# Patient Record
Sex: FEMALE | Race: WHITE | NOT HISPANIC OR LATINO | Employment: OTHER | URBAN - METROPOLITAN AREA
[De-identification: names, ages, dates, MRNs, and addresses within clinical notes are randomized per-mention and may not be internally consistent; named-entity substitution may affect disease eponyms.]

---

## 2023-03-07 ENCOUNTER — TELEPHONE (OUTPATIENT)
Dept: PRIMARY CARE | Facility: CLINIC | Age: 78
End: 2023-03-07
Payer: MEDICARE

## 2023-03-07 RX ORDER — LEVOTHYROXINE SODIUM 112 UG/1
112 TABLET ORAL
COMMUNITY
Start: 2013-07-29 | End: 2024-01-10 | Stop reason: SDUPTHER

## 2023-03-07 RX ORDER — PRAVASTATIN SODIUM 80 MG/1
1 TABLET ORAL NIGHTLY
COMMUNITY
Start: 2013-05-27 | End: 2023-11-08

## 2023-03-08 ENCOUNTER — DOCUMENTATION (OUTPATIENT)
Dept: PRIMARY CARE | Facility: CLINIC | Age: 78
End: 2023-03-08
Payer: MEDICARE

## 2023-05-01 PROBLEM — F41.9 ANXIETY: Status: ACTIVE | Noted: 2023-05-01

## 2023-05-01 PROBLEM — N81.10 VAGINAL PROLAPSE: Status: ACTIVE | Noted: 2023-05-01

## 2023-05-01 PROBLEM — N89.8 VAGINAL DRYNESS: Status: ACTIVE | Noted: 2023-05-01

## 2023-05-01 PROBLEM — E78.5 HYPERLIPIDEMIA: Status: ACTIVE | Noted: 2023-05-01

## 2023-05-01 PROBLEM — I10 HYPERTENSION: Status: ACTIVE | Noted: 2023-05-01

## 2023-05-01 PROBLEM — M85.80 OSTEOPENIA: Status: ACTIVE | Noted: 2023-05-01

## 2023-05-01 PROBLEM — M19.042 ARTHRITIS OF BOTH HANDS: Status: ACTIVE | Noted: 2023-05-01

## 2023-05-01 PROBLEM — L71.9 ROSACEA: Status: ACTIVE | Noted: 2023-05-01

## 2023-05-01 PROBLEM — E55.9 VITAMIN D DEFICIENCY: Status: ACTIVE | Noted: 2023-05-01

## 2023-05-01 PROBLEM — M19.011 ARTHRITIS OF SHOULDER REGION, RIGHT: Status: ACTIVE | Noted: 2023-05-01

## 2023-05-01 PROBLEM — E03.9 HYPOTHYROIDISM: Status: ACTIVE | Noted: 2023-05-01

## 2023-05-01 PROBLEM — M19.041 ARTHRITIS OF BOTH HANDS: Status: ACTIVE | Noted: 2023-05-01

## 2023-05-01 PROBLEM — K21.9 GERD (GASTROESOPHAGEAL REFLUX DISEASE): Status: ACTIVE | Noted: 2023-05-01

## 2023-05-01 RX ORDER — ESTRADIOL 0.1 MG/G
CREAM VAGINAL
COMMUNITY
Start: 2022-06-17 | End: 2024-06-10 | Stop reason: SDUPTHER

## 2023-05-01 RX ORDER — METOPROLOL SUCCINATE 50 MG/1
TABLET, EXTENDED RELEASE ORAL 2 TIMES DAILY
COMMUNITY
Start: 2017-02-15 | End: 2023-05-03 | Stop reason: SDUPTHER

## 2023-05-01 RX ORDER — METOPROLOL TARTRATE 25 MG/1
TABLET, FILM COATED ORAL
COMMUNITY
Start: 2023-01-19 | End: 2023-05-03 | Stop reason: SDUPTHER

## 2023-05-01 RX ORDER — OMEPRAZOLE 20 MG/1
1 TABLET, DELAYED RELEASE ORAL DAILY
COMMUNITY
Start: 2022-10-13 | End: 2023-05-03

## 2023-05-01 RX ORDER — LORAZEPAM 0.5 MG/1
TABLET ORAL
COMMUNITY
Start: 2022-12-21 | End: 2023-08-04 | Stop reason: SDUPTHER

## 2023-05-01 NOTE — PROGRESS NOTES
Subjective   Yasmine Ortiz is a 77 y.o. female who presents for patient is here for a follow-up.  HPI  Yasmine is a 77-year-old female with known history of hypertension hypothyroidism, dyslipidemia patient takes medication prescribed.  Unremarkable denies chest pain shortness of breath fever chills, mammogram planned for July 2023 , brought in Blood pressure chart BP is well controled.     Review of Systems  10 system review pertinent as above  Objective     There were no vitals taken for this visit.   Physical Exam    HEENT: Atraumatic normocephalic the pupils are equal and round and reactive to light the sclerae nonicteric extraocular motion are intact.  Neck: Is supple without JVD no carotid bruits the trachea is midline there are no masses pulses are equal and bilateral with normal upstroke.  Skin: Normal.  Skin good texture.  Moist.  Good turgor.  No lesions, no rashes.  Lymph: No lymphadenopathy appreciated, no masses, no lesions  Lungs: Are clear to auscultation and percussion, good breath sounds bilaterally, no rhonchi, no wheezing, good diaphragmatic excursion.  Heart: Normal rate and normal rhythm S1, S2, no S3, no gallop, murmur or rub.  Abdomen: Soft, nontender, no organomegaly, good bowel sounds.    Extremities: Full range of motion, good pulses bilateral.  No cyanosis, no clubbing or edema.  Neuro: Cranial nerves II-XII are grossly intact there is no sensory or motor deficits.  Able to move all extremities.        Assessment/Plan     Here for follow-up, fasting blood work    CBC BMP lipids AST ALT vitamin D 25-hydroxy  TSH    Colonoscopy 2017 doing okay H.at this time unless symptoms  Mammogram July 2022  Bone density 2021    Heart Palpitatuion  Metoprolol tartarated take as needed for heart palpitation    Hypertension  No added salt diet, do not and salt to your food  Try to exercise every other day for 30 minutes  Continue current medications  Metoprolol succinate XL 25 mg in the morning  And 25 mg at  night (half of 50 mg)      Hypothyroidism  Continue current medications  Report any changes such as weight gain or weight loss  Continue to exercise regularly    Continue with the low-fat, low-cholesterol diet,  I recommended Mediterranean diet, which include fish, chicken, vegetables and olive oil  Exercise daily for 30 minutes at least 3 times a week  Continue home medications      Gastroesophageal reflux disease  Continue current medications  Include spicy foods do not eat late at night  Do not wear tight fitting clothes  Exercise daily    Back pain   Reviewed with patient core exercises          Problem List Items Addressed This Visit          Circulatory    Hypertension - Primary    Relevant Medications    metoprolol succinate XL (Toprol-XL) 50 mg 24 hr tablet       Digestive    GERD (gastroesophageal reflux disease)       Genitourinary    Vaginal dryness       Musculoskeletal    Osteopenia       Endocrine/Metabolic    Hypothyroidism       Other    Hyperlipidemia     Other Visit Diagnoses       Heart palpitations        Relevant Medications    metoprolol tartrate (Lopressor) 25 mg tablet              Brenden Milner MD

## 2023-05-03 ENCOUNTER — OFFICE VISIT (OUTPATIENT)
Dept: PRIMARY CARE | Facility: CLINIC | Age: 78
End: 2023-05-03
Payer: MEDICARE

## 2023-05-03 VITALS — BODY MASS INDEX: 22.99 KG/M2 | WEIGHT: 138 LBS | HEIGHT: 65 IN

## 2023-05-03 DIAGNOSIS — N89.8 VAGINAL DRYNESS: ICD-10-CM

## 2023-05-03 DIAGNOSIS — E03.9 HYPOTHYROIDISM, UNSPECIFIED TYPE: ICD-10-CM

## 2023-05-03 DIAGNOSIS — M85.80 OSTEOPENIA, UNSPECIFIED LOCATION: ICD-10-CM

## 2023-05-03 DIAGNOSIS — E78.5 HYPERLIPIDEMIA, UNSPECIFIED HYPERLIPIDEMIA TYPE: ICD-10-CM

## 2023-05-03 DIAGNOSIS — R00.2 HEART PALPITATIONS: ICD-10-CM

## 2023-05-03 DIAGNOSIS — I10 PRIMARY HYPERTENSION: Primary | ICD-10-CM

## 2023-05-03 DIAGNOSIS — K21.9 GASTROESOPHAGEAL REFLUX DISEASE WITHOUT ESOPHAGITIS: ICD-10-CM

## 2023-05-03 PROBLEM — I47.10 SVT (SUPRAVENTRICULAR TACHYCARDIA) (CMS-HCC): Status: ACTIVE | Noted: 2019-06-04

## 2023-05-03 PROCEDURE — G0438 PPPS, INITIAL VISIT: HCPCS | Performed by: INTERNAL MEDICINE

## 2023-05-03 PROCEDURE — 99214 OFFICE O/P EST MOD 30 MIN: CPT | Performed by: INTERNAL MEDICINE

## 2023-05-03 PROCEDURE — 1170F FXNL STATUS ASSESSED: CPT | Performed by: INTERNAL MEDICINE

## 2023-05-03 PROCEDURE — 1036F TOBACCO NON-USER: CPT | Performed by: INTERNAL MEDICINE

## 2023-05-03 PROCEDURE — 1157F ADVNC CARE PLAN IN RCRD: CPT | Performed by: INTERNAL MEDICINE

## 2023-05-03 RX ORDER — METOPROLOL SUCCINATE 50 MG/1
25 TABLET, EXTENDED RELEASE ORAL 2 TIMES DAILY
Qty: 60 TABLET | Refills: 3
Start: 2023-05-03 | End: 2023-11-08

## 2023-05-03 RX ORDER — METOPROLOL TARTRATE 25 MG/1
25 TABLET, FILM COATED ORAL DAILY
Qty: 30 TABLET | Refills: 3
Start: 2023-05-03

## 2023-05-03 RX ORDER — AMOXICILLIN 500 MG/1
CAPSULE ORAL
COMMUNITY
Start: 2023-03-22 | End: 2023-08-04 | Stop reason: ALTCHOICE

## 2023-05-03 ASSESSMENT — ACTIVITIES OF DAILY LIVING (ADL)
ADEQUATE_TO_COMPLETE_ADL: YES
HEARING - RIGHT EAR: FUNCTIONAL
FEEDING YOURSELF: INDEPENDENT
BATHING: INDEPENDENT
USING TRANSPORTATION: INDEPENDENT
PATIENT'S MEMORY ADEQUATE TO SAFELY COMPLETE DAILY ACTIVITIES?: YES
NEEDS ASSISTANCE WITH FOOD: INDEPENDENT
STIL DRIVING: YES
WALKS IN HOME: INDEPENDENT
TOILETING: INDEPENDENT
DOING HOUSEWORK: INDEPENDENT
USING TELEPHONE: INDEPENDENT
HEARING - LEFT EAR: FUNCTIONAL
EATING: INDEPENDENT
DRESSING YOURSELF: INDEPENDENT
GROOMING: INDEPENDENT
MANAGING FINANCES: INDEPENDENT
JUDGMENT_ADEQUATE_SAFELY_COMPLETE_DAILY_ACTIVITIES: YES
TAKING MEDICATION: INDEPENDENT
PILL BOX USED: NO
GROCERY SHOPPING: INDEPENDENT
PREPARING MEALS: INDEPENDENT

## 2023-05-03 ASSESSMENT — ANXIETY QUESTIONNAIRES
5. BEING SO RESTLESS THAT IT IS HARD TO SIT STILL: NOT AT ALL
2. NOT BEING ABLE TO STOP OR CONTROL WORRYING: NOT AT ALL
1. FEELING NERVOUS, ANXIOUS, OR ON EDGE: SEVERAL DAYS
GAD7 TOTAL SCORE: 3
3. WORRYING TOO MUCH ABOUT DIFFERENT THINGS: NOT AT ALL
6. BECOMING EASILY ANNOYED OR IRRITABLE: SEVERAL DAYS
7. FEELING AFRAID AS IF SOMETHING AWFUL MIGHT HAPPEN: NOT AT ALL
4. TROUBLE RELAXING: SEVERAL DAYS

## 2023-05-03 ASSESSMENT — PATIENT HEALTH QUESTIONNAIRE - PHQ9
SUM OF ALL RESPONSES TO PHQ9 QUESTIONS 1 AND 2: 0
2. FEELING DOWN, DEPRESSED OR HOPELESS: NOT AT ALL
1. LITTLE INTEREST OR PLEASURE IN DOING THINGS: NOT AT ALL

## 2023-05-03 ASSESSMENT — COLUMBIA-SUICIDE SEVERITY RATING SCALE - C-SSRS
6. HAVE YOU EVER DONE ANYTHING, STARTED TO DO ANYTHING, OR PREPARED TO DO ANYTHING TO END YOUR LIFE?: NO
1. IN THE PAST MONTH, HAVE YOU WISHED YOU WERE DEAD OR WISHED YOU COULD GO TO SLEEP AND NOT WAKE UP?: NO
2. HAVE YOU ACTUALLY HAD ANY THOUGHTS OF KILLING YOURSELF?: NO

## 2023-05-03 ASSESSMENT — ENCOUNTER SYMPTOMS
DEPRESSION: 0
OCCASIONAL FEELINGS OF UNSTEADINESS: 0
LOSS OF SENSATION IN FEET: 0

## 2023-05-03 NOTE — PROGRESS NOTES
"Subjective   Reason for Visit: Yasmine Ortiz is an 77 y.o. female here for a Medicare Wellness visit.               HPI  Yasmine is a 69-year-old here for Medicare wellness, she takes her medication prescribed she lives independently at home, she is self-sufficient reports no falling no syncope no near syncope.  Patient Care Team:  Brenden Milner MD as PCP - General     Review of Systems  10 system review pertinent as above  Objective   Vitals:  Ht 1.651 m (5' 5\")   Wt 62.6 kg (138 lb)   BMI 22.96 kg/m²       Physical Exam  HEENT: Atraumatic normocephalic the pupils are equal and round and reactive to light the sclerae nonicteric extraocular motion are intact.  Neck: Is supple without JVD no carotid bruits the trachea is midline there are no masses pulses are equal and bilateral with normal upstroke.  Skin: Normal.  Skin good texture.  Moist.  Good turgor.  No lesions, no rashes.  Lymph: No lymphadenopathy appreciated, no masses, no lesions  Lungs: Are clear to auscultation and percussion, good breath sounds bilaterally, no rhonchi, no wheezing, good diaphragmatic excursion.  Heart: Normal rate and normal rhythm S1, S2, no S3, no gallop, murmur or rub.  Abdomen: Soft, nontender, no organomegaly, good bowel sounds.    Extremities: Full range of motion, good pulses bilateral.  No cyanosis, no clubbing or edema.  Neuro: Cranial nerves II-XII are grossly intact there is no sensory or motor deficits.  Able to move all extremities.  Assessment/Plan     Medicare wellness  Problem List Items Addressed This Visit          Circulatory    Hypertension - Primary    Relevant Medications    metoprolol succinate XL (Toprol-XL) 50 mg 24 hr tablet       Digestive    GERD (gastroesophageal reflux disease)       Genitourinary    Vaginal dryness       Musculoskeletal    Osteopenia       Endocrine/Metabolic    Hypothyroidism       Other    Hyperlipidemia     Other Visit Diagnoses       Heart palpitations        Relevant Medications    " metoprolol tartrate (Lopressor) 25 mg tablet

## 2023-06-23 ENCOUNTER — TELEPHONE (OUTPATIENT)
Dept: PRIMARY CARE | Facility: CLINIC | Age: 78
End: 2023-06-23
Payer: MEDICARE

## 2023-07-11 ENCOUNTER — TELEPHONE (OUTPATIENT)
Dept: PRIMARY CARE | Facility: CLINIC | Age: 78
End: 2023-07-11
Payer: MEDICARE

## 2023-07-19 LAB — URINE CULTURE: ABNORMAL

## 2023-08-02 NOTE — PROGRESS NOTES
Subjective   Yasmine Ortiz is a 77 y.o. female who presents for patient is here for a follow-up.  HPI  Yasmine is a 77-year-old female with known history of hypertension hypothyroidism, dyslipidemia patient takes medication prescribed.  Patient denies chest pain shortness of breath fever chill nausea vomiting constipation diarrhea dysuria urgency frequency.  Patient is here for follow-up and fasting blood work.    Review of Systems  10 system review pertinent as above  Objective     Visit Vitals  /76   Pulse 78   Temp 36.8 °C (98.2 °F)   Resp 15      Physical Exam    HEENT: Atraumatic normocephalic the pupils are equal and round and reactive to light the sclerae nonicteric extraocular motion are intact.  Neck: Is supple without JVD no carotid bruits the trachea is midline there are no masses pulses are equal and bilateral with normal upstroke.  Skin: Normal.  Skin good texture.  Moist.  Good turgor.  No lesions, no rashes.  Lymph: No lymphadenopathy appreciated, no masses, no lesions  Lungs: Are clear to auscultation and percussion, good breath sounds bilaterally, no rhonchi, no wheezing, good diaphragmatic excursion.  Heart: Normal rate and normal rhythm S1, S2, no S3, no gallop, murmur or rub.  Abdomen: Soft, nontender, no organomegaly, good bowel sounds.    Extremities: Full range of motion, good pulses bilateral.  No cyanosis, no clubbing or edema.  Neuro: Cranial nerves II-XII are grossly intact there is no sensory or motor deficits.  Able to move all extremities.      Assessment/Plan     Here for blood work    CBC BMP lipids AST ALT vitamin D 25-hydroxy    Last colonoscopy 2017 asymptomatic  Mammogram July 2022  Bone density 2021    Heart Palpitatuion  Metoprolol tartarated 25 mg daily  Doing well    Hypertension  No added salt diet, do not and salt to your food  Try to exercise every other day for 30 minutes  Continue current medications  Metoprolol 50 mg daily  25 mg in addition for palpitation  PRN    Hypothyroidism  Continue current medications  Report any changes such as weight gain or weight loss  Continue to exercise regularly  Goes to [facility] in [place]    Continue with the low-fat, low-cholesterol diet,  I recommended Mediterranean diet, which include fish, chicken, vegetables and olive oil  Exercise daily for 30 minutes at least 3 times a week  Continue home medications    Gastroesophageal reflux disease  Continue current medications  Include spicy foods do not eat late at night  Do not wear tight fitting clothes  Exercise daily    Anxiety with panic  On as needed lorazepam  Last prescription February 24, 2023  For 30 pills    6 mm Pul module non smoker  CT Chest repeat March 2024 repeat     OARRS:  Brenden Milner MD on 8/4/2023 10:13 AM  I have personally reviewed the OARRS report for Yasmine Ortiz. I have considered the risks of abuse, dependence, addiction and diversion and I believe that it is clinically appropriate for Yasmine Ortiz to be prescribed this medication    Is the patient prescribed a combination of a benzodiazepine and opioid?  No    Last Urine Drug Screen / ordered today: Yes  Recent Results (from the past 85540 hour(s))   Drug Screen, Urine With Reflex to Confirmation    Collection Time: 02/15/23 10:54 AM   Result Value Ref Range    DRUG SCREEN COMMENT URINE SEE BELOW     Amphetamine Screen, Urine PRESUMPTIVE NEGATIVE NEGATIVE    Barbiturate Screen, Urine PRESUMPTIVE NEGATIVE NEGATIVE    BENZODIAZEPINE (PRESENCE) IN URINE BY SCREEN METHOD PRESUMPTIVE NEGATIVE NEGATIVE    Cannabinoid Screen, Urine PRESUMPTIVE NEGATIVE NEGATIVE    Cocaine Screen, Urine PRESUMPTIVE NEGATIVE NEGATIVE    Fentanyl, Ur PRESUMPTIVE NEGATIVE NEGATIVE    Methadone Screen, Urine PRESUMPTIVE NEGATIVE NEGATIVE    Opiate Screen, Urine PRESUMPTIVE NEGATIVE NEGATIVE    Oxycodone Screen, Ur PRESUMPTIVE NEGATIVE NEGATIVE    PCP Screen, Urine PRESUMPTIVE NEGATIVE NEGATIVE     Results are as expected.      Controlled Substance Agreement:  Date of the Last Agreement: August 4, 2023  Reviewed Controlled Substance Agreement including but not limited to the benefits, risks, and alternatives to treatment with a Controlled Substance medication(s).    Benzodiazepines:  What is the patient's goal of therapy?  Anxiety panic  Is this being achieved with current treatment?  Yes    SANDRA-7:  No data recorded    Activities of Daily Living:   Is your overall impression that this patient is benefiting (symptom reduction outweighs side effects) from benzodiazepine therapy? Yes     1. Physical Functioning: Better  2. Family Relationship: Better  3. Social Relationship: Better  4. Mood: Better  5. Sleep Patterns: Better  6. Overall Function: Better  Problem List Items Addressed This Visit       Anxiety    Relevant Medications    LORazepam (Ativan) 0.5 mg tablet    GERD (gastroesophageal reflux disease)    Relevant Orders    CBC    Hyperlipidemia    Relevant Orders    Lipid Panel    AST    ALT    Hypothyroidism    Relevant Orders    TSH    Hypertension    Relevant Orders    Basic Metabolic Panel    Vitamin D deficiency    Relevant Orders    Vitamin D 25-Hydroxy,Total    SVT (supraventricular tachycardia) (CMS/HCC)    Medication management - Primary    Relevant Orders    Drug Screen, Urine With Reflex to Confirmation       Brenden Milner MD

## 2023-08-04 ENCOUNTER — OFFICE VISIT (OUTPATIENT)
Dept: PRIMARY CARE | Facility: CLINIC | Age: 78
End: 2023-08-04
Payer: MEDICARE

## 2023-08-04 VITALS
SYSTOLIC BLOOD PRESSURE: 122 MMHG | TEMPERATURE: 98.2 F | HEIGHT: 65 IN | HEART RATE: 78 BPM | RESPIRATION RATE: 15 BRPM | DIASTOLIC BLOOD PRESSURE: 76 MMHG | BODY MASS INDEX: 22.99 KG/M2 | WEIGHT: 138 LBS

## 2023-08-04 DIAGNOSIS — Z79.899 MEDICATION MANAGEMENT: Primary | ICD-10-CM

## 2023-08-04 DIAGNOSIS — E03.9 HYPOTHYROIDISM, UNSPECIFIED TYPE: ICD-10-CM

## 2023-08-04 DIAGNOSIS — E78.5 HYPERLIPIDEMIA, UNSPECIFIED HYPERLIPIDEMIA TYPE: ICD-10-CM

## 2023-08-04 DIAGNOSIS — K21.9 GASTROESOPHAGEAL REFLUX DISEASE WITHOUT ESOPHAGITIS: ICD-10-CM

## 2023-08-04 DIAGNOSIS — I10 PRIMARY HYPERTENSION: ICD-10-CM

## 2023-08-04 DIAGNOSIS — I47.10 SVT (SUPRAVENTRICULAR TACHYCARDIA) (CMS-HCC): ICD-10-CM

## 2023-08-04 DIAGNOSIS — F41.9 ANXIETY: ICD-10-CM

## 2023-08-04 DIAGNOSIS — E55.9 VITAMIN D DEFICIENCY: ICD-10-CM

## 2023-08-04 PROBLEM — L84 PRE-ULCERATIVE CORN OR CALLOUS: Status: ACTIVE | Noted: 2023-08-04

## 2023-08-04 PROCEDURE — 99214 OFFICE O/P EST MOD 30 MIN: CPT | Performed by: INTERNAL MEDICINE

## 2023-08-04 PROCEDURE — 80307 DRUG TEST PRSMV CHEM ANLYZR: CPT

## 2023-08-04 PROCEDURE — 80048 BASIC METABOLIC PNL TOTAL CA: CPT | Performed by: INTERNAL MEDICINE

## 2023-08-04 PROCEDURE — 1126F AMNT PAIN NOTED NONE PRSNT: CPT | Performed by: INTERNAL MEDICINE

## 2023-08-04 PROCEDURE — 82306 VITAMIN D 25 HYDROXY: CPT | Performed by: INTERNAL MEDICINE

## 2023-08-04 PROCEDURE — 84450 TRANSFERASE (AST) (SGOT): CPT | Performed by: INTERNAL MEDICINE

## 2023-08-04 PROCEDURE — 84443 ASSAY THYROID STIM HORMONE: CPT | Performed by: INTERNAL MEDICINE

## 2023-08-04 PROCEDURE — 1036F TOBACCO NON-USER: CPT | Performed by: INTERNAL MEDICINE

## 2023-08-04 PROCEDURE — 1157F ADVNC CARE PLAN IN RCRD: CPT | Performed by: INTERNAL MEDICINE

## 2023-08-04 PROCEDURE — 3078F DIAST BP <80 MM HG: CPT | Performed by: INTERNAL MEDICINE

## 2023-08-04 PROCEDURE — 84460 ALANINE AMINO (ALT) (SGPT): CPT | Performed by: INTERNAL MEDICINE

## 2023-08-04 PROCEDURE — 1159F MED LIST DOCD IN RCRD: CPT | Performed by: INTERNAL MEDICINE

## 2023-08-04 PROCEDURE — 85025 COMPLETE CBC W/AUTO DIFF WBC: CPT | Performed by: INTERNAL MEDICINE

## 2023-08-04 PROCEDURE — 3074F SYST BP LT 130 MM HG: CPT | Performed by: INTERNAL MEDICINE

## 2023-08-04 PROCEDURE — 80061 LIPID PANEL: CPT | Performed by: INTERNAL MEDICINE

## 2023-08-04 RX ORDER — DICLOFENAC SODIUM 10 MG/G
GEL TOPICAL
COMMUNITY
Start: 2023-05-05

## 2023-08-04 RX ORDER — LORAZEPAM 0.5 MG/1
0.5 TABLET ORAL DAILY
Qty: 30 TABLET | Refills: 0 | Status: SHIPPED | OUTPATIENT
Start: 2023-08-04 | End: 2023-11-29 | Stop reason: SDUPTHER

## 2023-08-04 RX ORDER — VIT C/E/ZN/COPPR/LUTEIN/ZEAXAN 250MG-90MG
1000 CAPSULE ORAL
COMMUNITY

## 2023-08-04 RX ORDER — TAMSULOSIN HYDROCHLORIDE 0.4 MG/1
0.4 CAPSULE ORAL
COMMUNITY
Start: 2022-03-02 | End: 2023-08-04 | Stop reason: ALTCHOICE

## 2023-08-04 ASSESSMENT — ENCOUNTER SYMPTOMS
DEPRESSION: 0
OCCASIONAL FEELINGS OF UNSTEADINESS: 0
LOSS OF SENSATION IN FEET: 0

## 2023-08-04 ASSESSMENT — PAIN SCALES - GENERAL: PAINLEVEL: 0-NO PAIN

## 2023-08-05 LAB
AMPHETAMINE (PRESENCE) IN URINE BY SCREEN METHOD: NORMAL
BARBITURATES PRESENCE IN URINE BY SCREEN METHOD: NORMAL
BENZODIAZEPINE (PRESENCE) IN URINE BY SCREEN METHOD: NORMAL
CANNABINOIDS IN URINE BY SCREEN METHOD: NORMAL
COCAINE (PRESENCE) IN URINE BY SCREEN METHOD: NORMAL
DRUG SCREEN COMMENT URINE: NORMAL
FENTANYL URINE: NORMAL
METHADONE (PRESENCE) IN URINE BY SCREEN METHOD: NORMAL
OPIATES (PRESENCE) IN URINE BY SCREEN METHOD: NORMAL
OXYCODONE (PRESENCE) IN URINE BY SCREEN METHOD: NORMAL
PHENCYCLIDINE (PRESENCE) IN URINE BY SCREEN METHOD: NORMAL

## 2023-10-04 ENCOUNTER — TELEPHONE (OUTPATIENT)
Dept: PRIMARY CARE | Facility: CLINIC | Age: 78
End: 2023-10-04
Payer: MEDICARE

## 2023-10-05 ENCOUNTER — TELEMEDICINE (OUTPATIENT)
Dept: PRIMARY CARE | Facility: CLINIC | Age: 78
End: 2023-10-05
Payer: MEDICARE

## 2023-10-05 DIAGNOSIS — J01.01 ACUTE RECURRENT MAXILLARY SINUSITIS: Primary | ICD-10-CM

## 2023-10-05 PROCEDURE — 99213 OFFICE O/P EST LOW 20 MIN: CPT | Performed by: PHYSICIAN ASSISTANT

## 2023-10-05 RX ORDER — AMOXICILLIN AND CLAVULANATE POTASSIUM 500; 125 MG/1; MG/1
500 TABLET, FILM COATED ORAL 2 TIMES DAILY
Qty: 20 TABLET | Refills: 0 | Status: SHIPPED | OUTPATIENT
Start: 2023-10-05 | End: 2023-10-15

## 2023-10-05 ASSESSMENT — ENCOUNTER SYMPTOMS
ABDOMINAL PAIN: 0
DIFFICULTY URINATING: 0
PALPITATIONS: 0
FREQUENCY: 0
WEAKNESS: 0
TREMORS: 0
VOMITING: 0
FACIAL SWELLING: 0
SLEEP DISTURBANCE: 0
POLYDIPSIA: 0
COUGH: 1
CHILLS: 0
HEADACHES: 0
EYE PAIN: 0
NAUSEA: 0
APPETITE CHANGE: 0
CHEST TIGHTNESS: 0
EYE DISCHARGE: 0
DIARRHEA: 0
SORE THROAT: 1
HEMATURIA: 0
SINUS PAIN: 1
CONSTIPATION: 0
WHEEZING: 0
FATIGUE: 0
NERVOUS/ANXIOUS: 0
POLYPHAGIA: 0
ARTHRALGIAS: 0
SINUS PRESSURE: 1
DIZZINESS: 0
COLOR CHANGE: 0
ANAL BLEEDING: 0
JOINT SWELLING: 0
SHORTNESS OF BREATH: 0
CONFUSION: 0
MYALGIAS: 0
CHOKING: 0
NUMBNESS: 0
FEVER: 0
ABDOMINAL DISTENTION: 0

## 2023-10-05 NOTE — PROGRESS NOTES
Subjective   Patient ID: Yasmine Ortiz is a 78 y.o. female with known history of hypertension hypothyroidism, dyslipidemia who presents for Sinusitis.    HPI the patient is complaining of sinus infection with nasal discharge, sore throat, and  cough which she has been having for almost two weeks. She denies fever, chills, wheezing or shortness of breath.  She tested negative for COVID 19.    Review of Systems   Constitutional:  Negative for appetite change, chills, fatigue and fever.   HENT:  Positive for congestion, sinus pressure, sinus pain and sore throat. Negative for ear pain, facial swelling, hearing loss and nosebleeds.    Eyes:  Negative for pain, discharge and visual disturbance.   Respiratory:  Positive for cough. Negative for choking, chest tightness, shortness of breath and wheezing.    Cardiovascular:  Negative for chest pain, palpitations and leg swelling.   Gastrointestinal:  Negative for abdominal distention, abdominal pain, anal bleeding, constipation, diarrhea, nausea and vomiting.   Endocrine: Negative for cold intolerance, heat intolerance, polydipsia, polyphagia and polyuria.   Genitourinary:  Negative for difficulty urinating, frequency, hematuria and urgency.   Musculoskeletal:  Negative for arthralgias, gait problem, joint swelling and myalgias.   Skin:  Negative for color change and rash.   Neurological:  Negative for dizziness, tremors, syncope, weakness, numbness and headaches.   Psychiatric/Behavioral:  Negative for behavioral problems, confusion, sleep disturbance and suicidal ideas. The patient is not nervous/anxious.        Objective   There were no vitals taken for this visit.    Physical Exam    Assessment/Plan     Acute sinusitis  Start Augmentin 500-125 mg twice daily and a full stomach  Continue Mucinex D    Last colonoscopy 2017 asymptomatic  Mammogram July 2022  Bone density 2021    Heart Palpitatuion  Metoprolol tartarated 25 mg daily  Doing well    Hypertension  No added salt  diet, do not and salt to your food  Try to exercise every other day for 30 minutes  Continue current medications  Metoprolol 50 mg daily  25 mg in addition for palpitation PRN    Hypothyroidism  Continue current medications  Report any changes such as weight gain or weight loss  Continue to exercise regularly  Goes to [facility] in [place]    Continue with the low-fat, low-cholesterol diet,  I recommended Mediterranean diet, which include fish, chicken, vegetables and olive oil  Exercise daily for 30 minutes at least 3 times a week  Continue home medications    Gastroesophageal reflux disease  Continue current medications  Include spicy foods do not eat late at night  Do not wear tight fitting clothes  Exercise daily    Anxiety with panic  On as needed lorazepam  Last prescription February 24, 2023  For 30 pills    6 mm Pul module non smoker  CT Chest repeat March 2024 repeat

## 2023-10-20 PROBLEM — M25.511 RIGHT SHOULDER PAIN: Status: ACTIVE | Noted: 2023-10-20

## 2023-10-20 NOTE — PROGRESS NOTES
Physical Therapy  Physical Therapy Treatment Note    Patient Name: Yasmine Ortiz  MRN: [id number]  Today's Date: 10/24/2023  Time Calculation  Start Time: 1045  Stop Time: 1130  Time Calculation (min): 45 min  Referring provider: Dr. Noel Mercado    Insurance:  Visit number: 7 of [place]  Authorization info: no auth needed  Insurance Type: Medicare  onset 23   Medicare Certification Period: Beginnin2023 Ending: 10/ 10/ 2023    Medicare Recertification Period:  Beginning: 10/24/23 Endin23    Current Problem  1. Right shoulder pain, unspecified chronicity  Follow Up In Physical Therapy        General     Precautions   R shoulder replacement in 2016.     Subjective:   Subjective   Patient reports She was trying to put a picture up at home over her head and it made her bicep and pec really sore and painful. She wants to update her HEP. She thinks that some of the exercises are easier. She is still challenged by the ER exercises.     Pain   2-3/10    Objective:   Objective   23:  palpation: mild TTP along R upper trap     UE ROM: WNL B, no pain      UE MMT: R L  shoulder flex 5 / 5   scapt 4+ / 5   abd 4+ / 5   ER 4 / 5   IR 4 / 5  Bicep 5 / 5  Tricep 5 / 5        23  posture: RS, FH, TK      palpation: no TTP, pain located in anterior deltoid, bicep and supraspinatus and infraspinatus      scapular control: WNL     UE ROM: WNL B, no pain     UE MMT: R L     shoulder flex 4 / 5   scapt 4 / 5   abd 4 / 5   ER 4- / 5   IR 4 / 5  Bicep 4 / 5  Tricep 4+ / 5  .   Outcome Measures   Quick Dash score: 15.91      Treatments:   TE: (30')  UBE x 6 min  Standing ER w/ RTB  Standing flexion 2# 2 x10, first round with therapist scap assist  Standing scaption 2# 2 x10   Supine serratus punch 2# 2 x 10    Manual: STM to bicep and pec (10')    OP Education:   Access Code: [email or web address] (updated HEP today).      Assessment:  Pt tolerated progressing strengthening exercises well without increase in bicep  soreness. She is demonstrating increased strength and compliance with HEP over the last 4 weeks, She feels that she can do HEP for another 4 weeks on her own and see where she is then. Overall she is progressing ROM and strength well.      Plan:   Planned interventions include: cryotherapy, education/instruction, home program, hot pack, manual therapy, neuromuscular re-education and therapeutic exercises.   Frequency and duration: 1 time(s) a week, for 8-10 weeks.   Potential to achieve rehab goals is good       c.w. progress RTC strengthening; follow up in 4 weeks and assess how HEP is going. RECHECK nv.      Plan of care was developed with input and agreement by the patient.      Goals:     Active       PT Problem       PT Goal 1       Start:  10/24/23    Expected End:  12/24/23       Pt will be independent with ADLs including watering her plants without pain.  Pt will be independent with HEP  Pt will improve strength of R shoulder to 5/5 or to = L  Pt will report +4 on GROC                    Dania Ku, PT

## 2023-10-22 PROBLEM — R00.2 PALPITATION: Status: ACTIVE | Noted: 2023-10-22

## 2023-10-22 PROBLEM — M67.449 MUCOUS CYST OF FINGER: Status: ACTIVE | Noted: 2023-10-22

## 2023-10-22 RX ORDER — GUAIFENESIN, PSEUDOEPHEDRINE HYDROCHLORIDE 600; 60 MG/1; MG/1
1 TABLET, EXTENDED RELEASE ORAL DAILY PRN
COMMUNITY

## 2023-10-22 RX ORDER — BISMUTH SUBSALICYLATE 262 MG
1 TABLET,CHEWABLE ORAL DAILY
COMMUNITY

## 2023-10-22 RX ORDER — METRONIDAZOLE 7.5 MG/G
CREAM TOPICAL
COMMUNITY
End: 2024-01-10 | Stop reason: ALTCHOICE

## 2023-10-24 ENCOUNTER — TREATMENT (OUTPATIENT)
Dept: PHYSICAL THERAPY | Facility: CLINIC | Age: 78
End: 2023-10-24
Payer: MEDICARE

## 2023-10-24 DIAGNOSIS — M25.511 RIGHT SHOULDER PAIN, UNSPECIFIED CHRONICITY: Primary | ICD-10-CM

## 2023-10-24 PROCEDURE — 97140 MANUAL THERAPY 1/> REGIONS: CPT | Mod: GP

## 2023-10-24 PROCEDURE — 97110 THERAPEUTIC EXERCISES: CPT | Mod: GP

## 2023-10-25 ENCOUNTER — OFFICE VISIT (OUTPATIENT)
Dept: ORTHOPEDIC SURGERY | Facility: HOSPITAL | Age: 78
End: 2023-10-25
Payer: MEDICARE

## 2023-10-25 DIAGNOSIS — M19.019 SHOULDER ARTHRITIS: Primary | ICD-10-CM

## 2023-10-25 PROCEDURE — 99212 OFFICE O/P EST SF 10 MIN: CPT | Performed by: ORTHOPAEDIC SURGERY

## 2023-10-25 PROCEDURE — 1159F MED LIST DOCD IN RCRD: CPT | Performed by: ORTHOPAEDIC SURGERY

## 2023-10-25 PROCEDURE — 1126F AMNT PAIN NOTED NONE PRSNT: CPT | Performed by: ORTHOPAEDIC SURGERY

## 2023-10-25 PROCEDURE — 1036F TOBACCO NON-USER: CPT | Performed by: ORTHOPAEDIC SURGERY

## 2023-10-25 NOTE — PROGRESS NOTES
Patient is status post shoulder arthroplasty.  She has been having some pain and weakness and had 1 episode of possible partial subluxation.  She is doing generally well she has improved with therapy.  She has very little pain and is aware of slight weakness.    Right shoulder elevation 160 motor power abduction 4+ external rotation 4+ internal rotation 5.    Right shoulder arthritis    Patient is doing well.  She has improved significantly with therapy.  She should complete her therapy course and follow-up for x-ray again in March.    This was dictated using voice recognition software and not corrected for grammatical or spelling errors.   100

## 2023-11-08 DIAGNOSIS — E78.5 HYPERLIPIDEMIA, UNSPECIFIED HYPERLIPIDEMIA TYPE: Primary | ICD-10-CM

## 2023-11-08 DIAGNOSIS — I10 PRIMARY HYPERTENSION: ICD-10-CM

## 2023-11-08 RX ORDER — METOPROLOL SUCCINATE 50 MG/1
50 TABLET, EXTENDED RELEASE ORAL DAILY
Qty: 90 TABLET | Refills: 3 | Status: SHIPPED | OUTPATIENT
Start: 2023-11-08

## 2023-11-08 RX ORDER — PRAVASTATIN SODIUM 80 MG/1
80 TABLET ORAL NIGHTLY
Qty: 90 TABLET | Refills: 3 | Status: SHIPPED | OUTPATIENT
Start: 2023-11-08

## 2023-11-22 NOTE — PROGRESS NOTES
Physical Therapy  Physical Therapy Treatment Note    Patient Name: Yasmine Ortiz  MRN: [id number]  Today's Date: 2023  Time Calculation  Start Time: 0945  Stop Time: 1030  Time Calculation (min): 45 min    Referring provider: Dr. Noel Mercado  Onset date: 23    Insurance:  Visit number: 8 of [place]  Authorization info: no auth needed  Insurance Type: Medicare  onset 23   Medicare Certification Period: Beginnin2023 Ending: 10/ 10/ 2023    Medicare Recertification Period:  Beginning: 10/24/23 Endin23    Current Problem  1. Right shoulder pain, unspecified chronicity  Follow Up In Physical Therapy        General     Precautions   R shoulder replacement in 2016.     Subjective:   Subjective   Patient reports she has been doing well since coming in 4 weeks ago. She gets some soreness and pain if she has to carry something wide like the teresa totes.     Pain   0/10, more of a tightness and soreness    Objective:   Objective   23 RECHECK  palpation: mild TTP along R upper trap and pec minor  UE ROM: WNL B     UE MMT: R L  shoulder flex 5 / 5   scapt 4+/ 5   abd 4+ / 5   ER 4/ 5   IR 4+ / 5  Bicep 5 / 5  Tricep 5 / 5      23:  palpation: mild TTP along R upper trap     UE ROM: WNL B, no pain      UE MMT: R L  shoulder flex 5 / 5   scapt 4+ / 5   abd 4+ / 5   ER 4 / 5   IR 4 / 5  Bicep 5 / 5  Tricep 5 / 5        23  posture: RS, FH, TK      palpation: no TTP, pain located in anterior deltoid, bicep and supraspinatus and infraspinatus      scapular control: WNL     UE ROM: WNL B, no pain     UE MMT: R L     shoulder flex 4 / 5   scapt 4 / 5   abd 4 / 5   ER 4- / 5   IR 4 / 5  Bicep 4 / 5  Tricep 4+ / 5  .   Outcome Measures   Quick Dash score: 15.91, 23 15.91     Treatments:   TE: (45')  UBE x 6 min    RECHECK    Review Upper trap and Levator scap stretches 2 x 30s each   Should Horr Add Iso 2 x 10 w/ 10s hold  Review Standing flexion 2# x10   Review Standing scaption  2# x10   Review Supine serratus punch 2# x 10    Manual: DTM to Upper trap (10')    OP Education:   Access Code: [email or web address]     Assessment:     Pt is progressing fairly, She has slightly improve strength but improved ADLs without pain. She cont to have strength deficits with Shoulder ER. Her pec minor pain improved after isometric exercises but is experiencing some pain in her triceps intermittently.     Plan:   Planned interventions include: cryotherapy, education/instruction, home program, hot pack, manual therapy, neuromuscular re-education and therapeutic exercises.   Frequency and duration: 1 time(s) a week, for 8-10 weeks.   Potential to achieve rehab goals is good       c.w. progress RTC strengthening; follow up in 4 weeks and assess how HEP is going.     Plan of care was developed with input and agreement by the patient.      Goals:     Active       PT Problem       PT Goal 1       Start:  10/24/23    Expected End:  12/24/23       Pt will be independent with ADLs including watering her plants without pain.  Pt will be independent with HEP  Pt will improve strength of R shoulder to 5/5 or to = L  Pt will report +4 on GROC                    Dania Ku, PT

## 2023-11-27 ENCOUNTER — TREATMENT (OUTPATIENT)
Dept: PHYSICAL THERAPY | Facility: CLINIC | Age: 78
End: 2023-11-27
Payer: MEDICARE

## 2023-11-27 DIAGNOSIS — M25.511 RIGHT SHOULDER PAIN, UNSPECIFIED CHRONICITY: Primary | ICD-10-CM

## 2023-11-27 PROCEDURE — 97110 THERAPEUTIC EXERCISES: CPT | Mod: GP

## 2023-11-28 ENCOUNTER — OFFICE VISIT (OUTPATIENT)
Dept: PRIMARY CARE | Facility: CLINIC | Age: 78
End: 2023-11-28
Payer: MEDICARE

## 2023-11-28 VITALS
DIASTOLIC BLOOD PRESSURE: 86 MMHG | WEIGHT: 142 LBS | TEMPERATURE: 95.7 F | HEIGHT: 65 IN | BODY MASS INDEX: 23.66 KG/M2 | SYSTOLIC BLOOD PRESSURE: 128 MMHG | HEART RATE: 86 BPM

## 2023-11-28 DIAGNOSIS — I10 PRIMARY HYPERTENSION: Primary | ICD-10-CM

## 2023-11-28 DIAGNOSIS — E78.5 HYPERLIPIDEMIA, UNSPECIFIED HYPERLIPIDEMIA TYPE: ICD-10-CM

## 2023-11-28 PROCEDURE — 3074F SYST BP LT 130 MM HG: CPT | Performed by: PHYSICIAN ASSISTANT

## 2023-11-28 PROCEDURE — 1036F TOBACCO NON-USER: CPT | Performed by: PHYSICIAN ASSISTANT

## 2023-11-28 PROCEDURE — 3079F DIAST BP 80-89 MM HG: CPT | Performed by: PHYSICIAN ASSISTANT

## 2023-11-28 PROCEDURE — 93000 ELECTROCARDIOGRAM COMPLETE: CPT | Performed by: PHYSICIAN ASSISTANT

## 2023-11-28 PROCEDURE — 1159F MED LIST DOCD IN RCRD: CPT | Performed by: PHYSICIAN ASSISTANT

## 2023-11-28 PROCEDURE — 99214 OFFICE O/P EST MOD 30 MIN: CPT | Performed by: PHYSICIAN ASSISTANT

## 2023-11-28 PROCEDURE — 1160F RVW MEDS BY RX/DR IN RCRD: CPT | Performed by: PHYSICIAN ASSISTANT

## 2023-11-28 PROCEDURE — 1126F AMNT PAIN NOTED NONE PRSNT: CPT | Performed by: PHYSICIAN ASSISTANT

## 2023-11-28 ASSESSMENT — ENCOUNTER SYMPTOMS
CONSTIPATION: 0
FREQUENCY: 0
SORE THROAT: 0
ANAL BLEEDING: 0
DIZZINESS: 0
APPETITE CHANGE: 0
HEADACHES: 0
ABDOMINAL DISTENTION: 0
SLEEP DISTURBANCE: 0
FACIAL SWELLING: 0
HEMATURIA: 0
NAUSEA: 0
SHORTNESS OF BREATH: 0
COUGH: 0
CHILLS: 0
CHOKING: 0
LOSS OF SENSATION IN FEET: 0
ABDOMINAL PAIN: 0
NUMBNESS: 0
DIFFICULTY URINATING: 0
EYE PAIN: 0
COLOR CHANGE: 0
NERVOUS/ANXIOUS: 0
EYE DISCHARGE: 0
FEVER: 0
JOINT SWELLING: 0
PALPITATIONS: 0
WEAKNESS: 0
WHEEZING: 0
POLYDIPSIA: 0
DIARRHEA: 0
OCCASIONAL FEELINGS OF UNSTEADINESS: 0
HYPERTENSION: 1
VOMITING: 0
POLYPHAGIA: 0
TREMORS: 0
CHEST TIGHTNESS: 0
MYALGIAS: 0
CONFUSION: 0
ARTHRALGIAS: 0
FATIGUE: 0
DEPRESSION: 0

## 2023-11-28 ASSESSMENT — PATIENT HEALTH QUESTIONNAIRE - PHQ9
2. FEELING DOWN, DEPRESSED OR HOPELESS: NOT AT ALL
1. LITTLE INTEREST OR PLEASURE IN DOING THINGS: NOT AT ALL
SUM OF ALL RESPONSES TO PHQ9 QUESTIONS 1 AND 2: 0

## 2023-11-28 NOTE — PROGRESS NOTES
Subjective   Patient ID: Yasmine Ortiz is a 78 y.o. female with known history of hypertension hypothyroidism, dyslipidemia who presents for Hypertension.    Hypertension  Pertinent negatives include no chest pain, headaches, palpitations or shortness of breath.   The patient is complaining of high blood pressure which was 159/96 and 170/90 4 days ago. It went down to below 140/82 mmHg the next three days.  Stated that she did not monitor her blood pressure until she developed high pulse which made her monitor BP for 4 days. She felt pressure in her chest which lasted from a few seconds to a few minutes. She is currently on metoprolol XL 25 mg and occasionally takes metoprolol tartrate 25 mg as needed for heart palpitations. Denies SOB.     Review of Systems   Constitutional:  Negative for appetite change, chills, fatigue and fever.   HENT:  Negative for congestion, ear pain, facial swelling, hearing loss, nosebleeds and sore throat.    Eyes:  Negative for pain, discharge and visual disturbance.   Respiratory:  Negative for cough, choking, chest tightness, shortness of breath and wheezing.    Cardiovascular:  Negative for chest pain, palpitations and leg swelling.   Gastrointestinal:  Negative for abdominal distention, abdominal pain, anal bleeding, constipation, diarrhea, nausea and vomiting.   Endocrine: Negative for cold intolerance, heat intolerance, polydipsia, polyphagia and polyuria.   Genitourinary:  Negative for difficulty urinating, frequency, hematuria and urgency.   Musculoskeletal:  Negative for arthralgias, gait problem, joint swelling and myalgias.   Skin:  Negative for color change and rash.   Neurological:  Negative for dizziness, tremors, syncope, weakness, numbness and headaches.   Psychiatric/Behavioral:  Negative for behavioral problems, confusion, sleep disturbance and suicidal ideas. The patient is not nervous/anxious.        Objective   /86   Pulse 86   Temp 35.4 °C (95.7 °F)   Ht  "1.651 m (5' 5\")   Wt 64.4 kg (142 lb)   Breastfeeding No   BMI 23.63 kg/m²     Physical Exam    Assessment/Plan   Hypertension  Well controlled  Continue Metoprolol XR 50 mg daily and Metoprolol tartrate 25 mg in addition for palpitation   No added salt diet, do not and salt to your food  Try to exercise every day for 30 minutes  EKG is done today  In sinus rhythm   No acute changes  Monitor blood pressure daily   If elevated, come back to the office  Follow up with cardiology in January     Last colonoscopy 2017   Mammogram July 2022  Bone density 2021    Heart Palpitatuion  Metoprolol tartarated 25 mg daily  Doing well    Hypothyroidism  Last TSH normal  Continue current medications  Report any changes such as weight gain or weight loss  Continue to exercise regularly  Goes to [facility] in [place]    Continue with the low-fat, low-cholesterol diet,  I recommended Mediterranean diet, which include fish, chicken, vegetables and olive oil  Exercise daily for 30 minutes at least 3 times a week  Continue home medications    Gastroesophageal reflux disease  Continue current medications  Include spicy foods do not eat late at night  Do not wear tight fitting clothes  Exercise daily    Anxiety with panic  On lorazepam as needed   Drug screen done in August 6 mm Pul module non smoker  CT Chest repeat March 2024 repeat      "

## 2023-11-29 DIAGNOSIS — F41.9 ANXIETY: ICD-10-CM

## 2023-11-29 RX ORDER — LORAZEPAM 0.5 MG/1
0.5 TABLET ORAL DAILY
Qty: 30 TABLET | Refills: 0 | Status: SHIPPED | OUTPATIENT
Start: 2023-11-29 | End: 2023-12-26

## 2023-12-19 NOTE — PROGRESS NOTES
Physical Therapy  Physical Therapy Treatment Note    Patient Name: Yasmine Ortiz  MRN: [id number]  Today's Date: 2023  Time Calculation  Start Time: 1245  Stop Time: 1330  Time Calculation (min): 45 min    Referring provider: Dr. Noel Mercado  Onset date: 23    Insurance:  Visit number: 9 [place]  Authorization info: no auth needed  Insurance Type: Medicare  onset 23   Medicare Certification Period: Beginnin2023 Ending: 10/ 10/ 2023    Medicare Recertification Period:  Beginning: 10/24/23 Endin23    Current Problem  1. Right shoulder pain, unspecified chronicity            General     Precautions   R shoulder replacement in 2016.     Subjective:   Subjective   Patient reports she has been doing well since coming in 4 weeks ago. She gets some soreness and pain if she has to carry something wide like the teresa totes.     Pain   0/10, more of a tightness and soreness    Objective:   Objective   23 RECHECK  palpation: mild TTP along R upper trap and pec minor  UE ROM: WNL B     UE MMT: R L  shoulder flex 5 / 5   scapt 4+/ 5   abd 4+ / 5   ER 4/ 5   IR 4+ / 5  Bicep 5 / 5  Tricep 5 / 5      23:  palpation: mild TTP along R upper trap     UE ROM: WNL B, no pain      UE MMT: R L  shoulder flex 5 / 5   scapt 4+ / 5   abd 4+ / 5   ER 4 / 5   IR 4 / 5  Bicep 5 / 5  Tricep 5 / 5        23  posture: RS, FH, TK      palpation: no TTP, pain located in anterior deltoid, bicep and supraspinatus and infraspinatus      scapular control: WNL     UE ROM: WNL B, no pain     UE MMT: R L     shoulder flex 4 / 5   scapt 4 / 5   abd 4 / 5   ER 4- / 5   IR 4 / 5  Bicep 4 / 5  Tricep 4+ / 5  .   Outcome Measures   Quick Dash score: 15.91, 23 15.91     Treatments:   TE: (30')  UBE x 6 min  Therma probe  Review HEP, upgrade to black tube for rows and LAE's     Estim: 10'  Thermaprobe: 5 bar heat + stim level 8 x10'    OP Education:   Access Code: [email or web address]     Assessment:       Pt has  made great progress in the past month with less pain and improving strength.  She will benefit from a fu in 4wks to assess continued progress.      Plan:     c.w. progress RTC strengthening; follow up in 4 weeks and assess how HEP is going.    Planned interventions include: cryotherapy, education/instruction, home program, hot pack, manual therapy, neuromuscular re-education and therapeutic exercises.   Frequency and duration: 1 time(s) a week, for 8-10 weeks.   Potential to achieve rehab goals is good       Plan of care was developed with input and agreement by the patient.      Goals:     Active       PT Problem       PT Goal 1       Start:  10/24/23    Expected End:  12/24/23       Pt will be independent with ADLs including watering her plants without pain.  Pt will be independent with HEP  Pt will improve strength of R shoulder to 5/5 or to = L  Pt will report +4 on GROC                    Dania Ku, PT

## 2023-12-20 ENCOUNTER — TREATMENT (OUTPATIENT)
Dept: PHYSICAL THERAPY | Facility: CLINIC | Age: 78
End: 2023-12-20
Payer: MEDICARE

## 2023-12-20 DIAGNOSIS — M25.511 RIGHT SHOULDER PAIN, UNSPECIFIED CHRONICITY: Primary | ICD-10-CM

## 2023-12-20 PROCEDURE — 97110 THERAPEUTIC EXERCISES: CPT | Mod: GP

## 2023-12-20 PROCEDURE — 97032 APPL MODALITY 1+ESTIM EA 15: CPT | Mod: GP

## 2023-12-23 DIAGNOSIS — F41.9 ANXIETY: ICD-10-CM

## 2023-12-26 RX ORDER — LORAZEPAM 0.5 MG/1
0.5 TABLET ORAL DAILY
Qty: 90 TABLET | Refills: 1 | Status: SHIPPED | OUTPATIENT
Start: 2023-12-26

## 2023-12-29 ENCOUNTER — HOSPITAL ENCOUNTER (OUTPATIENT)
Dept: RADIOLOGY | Facility: HOSPITAL | Age: 78
Discharge: HOME | End: 2023-12-29
Payer: MEDICARE

## 2023-12-29 DIAGNOSIS — E78.5 HYPERLIPIDEMIA, UNSPECIFIED HYPERLIPIDEMIA TYPE: ICD-10-CM

## 2023-12-29 DIAGNOSIS — I10 PRIMARY HYPERTENSION: ICD-10-CM

## 2023-12-29 PROCEDURE — 75571 CT HRT W/O DYE W/CA TEST: CPT

## 2024-01-08 NOTE — PROGRESS NOTES
Subjective   Yasmine Ortiz is a 78 y.o. female who presents for patient is here for a follow-up.  HPI  Yasmine is a 77-year-old female with known history of hypertension hypothyroidism, dyslipidemia patient takes medication prescribed.  Patient denies chest pain shortness of breath fever chill nausea vomiting constipation diarrhea dysuria urgency frequency.  Patient is here for follow-up and fasting blood work. Requested refills for Mupirocin and Synthroid. Feeling well otherwise.     Review of Systems  10 system review pertinent as above  Objective     Visit Vitals  /84   Pulse 78   Temp 36.8 °C (98.2 °F)   Resp 16      Physical Exam    HEENT: Atraumatic normocephalic the pupils are equal and round and reactive to light the sclerae nonicteric extraocular motion are intact.  Neck: Is supple without JVD no carotid bruits the trachea is midline there are no masses pulses are equal and bilateral with normal upstroke.  Skin: Normal.  Skin good texture.  Moist.  Good turgor.  No lesions, no rashes.  Lymph: No lymphadenopathy appreciated, no masses, no lesions  Lungs: Are clear to auscultation and percussion, good breath sounds bilaterally, no rhonchi, no wheezing, good diaphragmatic excursion.  Heart: Normal rate and normal rhythm S1, S2, no S3, no gallop, murmur or rub.  Abdomen: Soft, nontender, no organomegaly, good bowel sounds.    Extremities: Full range of motion, good pulses bilateral.  No cyanosis, no clubbing or edema.  Neuro: Cranial nerves II-XII are grossly intact there is no sensory or motor deficits.  Able to move all extremities.      Assessment/Plan     Patient is here for blood work    CBC BMP lipids AST ALT vitamin D 25-hydroxy tsh    Last colonoscopy 2017 asymptomatic  Mammogram July 2022  Bone density 2021    Heart Palpitatuion  Metoprolol tartarated 25 mg daily  Doing well    Hypertension  No added salt diet, do not and salt to your food  Try to exercise every other day for 30 minutes  Continue  current medications  Metoprolol 50 mg daily  25 mg in addition for palpitation when needed    Sinusitis  On pseudoephedrine  Since patient wa 29 years of age  Tolerating no side effects  No palpitation     Hypothyroidism  Continue current medications  Report any changes such as weight gain or weight loss  Continue to exercise regularly  Goes to [facility] in [place]    Follows with Dr Domonique Messina  On esterase     Continue with the low-fat, low-cholesterol diet,  I recommended Mediterranean diet, which include fish, chicken, vegetables and olive oil  Exercise daily for 30 minutes at least 3 times a week  Continue home medications    Gastroesophageal reflux disease  Continue current medications  Include spicy foods do not eat late at night  Do not wear tight fitting clothes  Exercise daily    Anxiety with panic  On as needed lorazepam  Last prescription February 24, 2023  For 30 pills    6 mm Pul module non smoker  CT Chest repeat March 2024 repeat     OARRS:  No data recorded  I have personally reviewed the OARRS report for Yasmine Ortiz. I have considered the risks of abuse, dependence, addiction and diversion and I believe that it is clinically appropriate for Yasmine Ortiz to be prescribed this medication    Is the patient prescribed a combination of a benzodiazepine and opioid?  No    Last Urine Drug Screen / ordered today: Yes  Recent Results (from the past 8760 hour(s))   Drug Screen, Urine With Reflex to Confirmation    Collection Time: 08/04/23 10:42 AM   Result Value Ref Range    DRUG SCREEN COMMENT URINE SEE BELOW     Amphetamine Screen, Urine PRESUMPTIVE NEGATIVE NEGATIVE    Barbiturate Screen, Urine PRESUMPTIVE NEGATIVE NEGATIVE    BENZODIAZEPINE (PRESENCE) IN URINE BY SCREEN METHOD PRESUMPTIVE NEGATIVE NEGATIVE    Cannabinoid Screen, Urine PRESUMPTIVE NEGATIVE NEGATIVE    Cocaine Screen, Urine PRESUMPTIVE NEGATIVE NEGATIVE    Fentanyl, Ur PRESUMPTIVE NEGATIVE NEGATIVE    Methadone Screen, Urine  PRESUMPTIVE NEGATIVE NEGATIVE    Opiate Screen, Urine PRESUMPTIVE NEGATIVE NEGATIVE    Oxycodone Screen, Ur PRESUMPTIVE NEGATIVE NEGATIVE    PCP Screen, Urine PRESUMPTIVE NEGATIVE NEGATIVE     Results are as expected.     Controlled Substance Agreement:  Date of the Last Agreement: August 4, 2023  Reviewed Controlled Substance Agreement including but not limited to the benefits, risks, and alternatives to treatment with a Controlled Substance medication(s).    Benzodiazepines:  What is the patient's goal of therapy?  Anxiety panic  Is this being achieved with current treatment?  Yes    SANDRA-7:  No data recorded    Activities of Daily Living:   Is your overall impression that this patient is benefiting (symptom reduction outweighs side effects) from benzodiazepine therapy? Yes     1. Physical Functioning: Better  2. Family Relationship: Better  3. Social Relationship: Better  4. Mood: Better  5. Sleep Patterns: Better  6. Overall Function: Better  Problem List Items Addressed This Visit       Hypothyroidism - Primary    Relevant Medications    levothyroxine (Synthroid, Levoxyl) 112 mcg tablet    Other Relevant Orders    TSH    Hypertension    Relevant Orders    CBC    Basic Metabolic Panel     Other Visit Diagnoses       Impetigo        Relevant Medications    mupirocin (Bactroban) 2 % cream    Dyslipidemia        Relevant Orders    Lipid Panel    AST    ALT          Brenden Milenr MD

## 2024-01-10 ENCOUNTER — OFFICE VISIT (OUTPATIENT)
Dept: PRIMARY CARE | Facility: CLINIC | Age: 79
End: 2024-01-10
Payer: MEDICARE

## 2024-01-10 VITALS
SYSTOLIC BLOOD PRESSURE: 138 MMHG | TEMPERATURE: 98.2 F | WEIGHT: 140 LBS | HEART RATE: 78 BPM | RESPIRATION RATE: 16 BRPM | HEIGHT: 65 IN | DIASTOLIC BLOOD PRESSURE: 84 MMHG | BODY MASS INDEX: 23.32 KG/M2

## 2024-01-10 DIAGNOSIS — I10 PRIMARY HYPERTENSION: ICD-10-CM

## 2024-01-10 DIAGNOSIS — E03.9 HYPOTHYROIDISM, UNSPECIFIED TYPE: Primary | ICD-10-CM

## 2024-01-10 DIAGNOSIS — E55.9 VITAMIN D INSUFFICIENCY: ICD-10-CM

## 2024-01-10 DIAGNOSIS — E78.5 DYSLIPIDEMIA: ICD-10-CM

## 2024-01-10 DIAGNOSIS — L01.00 IMPETIGO: ICD-10-CM

## 2024-01-10 PROCEDURE — 80061 LIPID PANEL: CPT | Performed by: INTERNAL MEDICINE

## 2024-01-10 PROCEDURE — 84450 TRANSFERASE (AST) (SGOT): CPT | Performed by: INTERNAL MEDICINE

## 2024-01-10 PROCEDURE — 80048 BASIC METABOLIC PNL TOTAL CA: CPT | Performed by: INTERNAL MEDICINE

## 2024-01-10 PROCEDURE — 1036F TOBACCO NON-USER: CPT | Performed by: INTERNAL MEDICINE

## 2024-01-10 PROCEDURE — 3079F DIAST BP 80-89 MM HG: CPT | Performed by: INTERNAL MEDICINE

## 2024-01-10 PROCEDURE — 82306 VITAMIN D 25 HYDROXY: CPT | Performed by: INTERNAL MEDICINE

## 2024-01-10 PROCEDURE — 1126F AMNT PAIN NOTED NONE PRSNT: CPT | Performed by: INTERNAL MEDICINE

## 2024-01-10 PROCEDURE — 1159F MED LIST DOCD IN RCRD: CPT | Performed by: INTERNAL MEDICINE

## 2024-01-10 PROCEDURE — 84460 ALANINE AMINO (ALT) (SGPT): CPT | Performed by: INTERNAL MEDICINE

## 2024-01-10 PROCEDURE — 84443 ASSAY THYROID STIM HORMONE: CPT | Performed by: INTERNAL MEDICINE

## 2024-01-10 PROCEDURE — 3075F SYST BP GE 130 - 139MM HG: CPT | Performed by: INTERNAL MEDICINE

## 2024-01-10 PROCEDURE — 85025 COMPLETE CBC W/AUTO DIFF WBC: CPT | Performed by: INTERNAL MEDICINE

## 2024-01-10 PROCEDURE — 99214 OFFICE O/P EST MOD 30 MIN: CPT | Performed by: INTERNAL MEDICINE

## 2024-01-10 RX ORDER — LEVOTHYROXINE SODIUM 112 UG/1
112 TABLET ORAL
Qty: 90 TABLET | Refills: 3 | Status: SHIPPED | OUTPATIENT
Start: 2024-01-10 | End: 2025-01-09

## 2024-01-10 RX ORDER — MUPIROCIN CALCIUM 20 MG/G
CREAM TOPICAL 3 TIMES DAILY
Qty: 30 G | Refills: 0 | Status: SHIPPED | OUTPATIENT
Start: 2024-01-10 | End: 2024-01-18 | Stop reason: ALTCHOICE

## 2024-01-10 ASSESSMENT — ENCOUNTER SYMPTOMS
OCCASIONAL FEELINGS OF UNSTEADINESS: 0
DEPRESSION: 0
LOSS OF SENSATION IN FEET: 0

## 2024-01-10 ASSESSMENT — PAIN SCALES - GENERAL: PAINLEVEL: 0-NO PAIN

## 2024-01-15 DIAGNOSIS — L30.9 DERMATITIS: Primary | ICD-10-CM

## 2024-01-15 RX ORDER — BACITRACIN 500 [USP'U]/G
1 OINTMENT TOPICAL 2 TIMES DAILY
Qty: 14 G | Refills: 3 | Status: SHIPPED | OUTPATIENT
Start: 2024-01-15 | End: 2024-01-18 | Stop reason: ALTCHOICE

## 2024-01-15 RX ORDER — BACITRACIN 500 [USP'U]/G
1 OINTMENT TOPICAL 2 TIMES DAILY
Qty: 14 G | Refills: 3 | Status: SHIPPED | OUTPATIENT
Start: 2024-01-15 | End: 2024-01-15 | Stop reason: SDUPTHER

## 2024-01-18 ENCOUNTER — TELEPHONE (OUTPATIENT)
Dept: PRIMARY CARE | Facility: CLINIC | Age: 79
End: 2024-01-18
Payer: MEDICARE

## 2024-01-18 RX ORDER — MUPIROCIN 20 MG/G
OINTMENT TOPICAL 3 TIMES DAILY
Qty: 30 G | Refills: 3 | Status: SHIPPED | OUTPATIENT
Start: 2024-01-18 | End: 2024-01-28

## 2024-01-18 NOTE — TELEPHONE ENCOUNTER
[facility] called, pt insurance will not cover the mupirocin cream, it will cover the mupirocin ointment. Please change the pt rx to the Mupirocin ointment. Thanks

## 2024-01-29 ENCOUNTER — APPOINTMENT (OUTPATIENT)
Dept: PHYSICAL THERAPY | Facility: CLINIC | Age: 79
End: 2024-01-29
Payer: MEDICARE

## 2024-02-09 ENCOUNTER — TELEMEDICINE (OUTPATIENT)
Dept: PRIMARY CARE | Facility: CLINIC | Age: 79
End: 2024-02-09
Payer: MEDICARE

## 2024-02-09 DIAGNOSIS — J01.10 ACUTE NON-RECURRENT FRONTAL SINUSITIS: Primary | ICD-10-CM

## 2024-02-09 PROCEDURE — 1157F ADVNC CARE PLAN IN RCRD: CPT | Performed by: INTERNAL MEDICINE

## 2024-02-09 PROCEDURE — 1126F AMNT PAIN NOTED NONE PRSNT: CPT | Performed by: INTERNAL MEDICINE

## 2024-02-09 PROCEDURE — 1036F TOBACCO NON-USER: CPT | Performed by: INTERNAL MEDICINE

## 2024-02-09 PROCEDURE — 99213 OFFICE O/P EST LOW 20 MIN: CPT | Performed by: INTERNAL MEDICINE

## 2024-02-09 PROCEDURE — 1159F MED LIST DOCD IN RCRD: CPT | Performed by: INTERNAL MEDICINE

## 2024-02-09 RX ORDER — AMOXICILLIN AND CLAVULANATE POTASSIUM 500; 125 MG/1; MG/1
500 TABLET, FILM COATED ORAL 3 TIMES DAILY
Qty: 30 TABLET | Refills: 0 | Status: SHIPPED | OUTPATIENT
Start: 2024-02-09 | End: 2024-02-19

## 2024-02-09 NOTE — PROGRESS NOTES
Subjective   Yasmine Ortiz is a 78 y.o. female who presents for patient is here for a virtual visit ear pain .  HPI  Yasmine is a 78-year-old female with known history of hypertension hypothyroidism, dyslipidemia patient takes medication prescribed.  Patient denies chest pain shortness of breath fever chill nausea vomiting constipation diarrhea dysuria urgency frequency.  Patient complaining of right ear pain and sinus congestion for last few days, she is having difficulties breathing through the right nare, due to the congestion.  She denies fever chills diaphoresis no loss of smell or taste, endorses right ear pain right eye socket pain.     Review of Systems  10 system review pertinent as above  Objective   Virtual visit  There were no vitals taken for this visit.     Physical Exam  Virtual visit      Assessment/Plan     Virtual visit  Acute maxillary sinusitis  With complications  Including right otalgia  Right eye pain  Suspect due to increased sinus pressure  Augmentin 500 mg 3 times a day  Hold Sudafed for now  Fluids and rest  Steam as able to try and loosen up the right  Sinus pressure  Call if not improved        Hypertension  No added salt diet, do not and salt to your food  Try to exercise every other day for 30 minutes  Continue current medications  Metoprolol 50 mg daily  25 mg in addition for palpitation when needed    Sinusitis  On pseudoephedrine  Since patient wa 29 years of age  Tolerating no side effects  No palpitation     Hypothyroidism  Continue current medications  Report any changes such as weight gain or weight loss  Continue to exercise regularly  Goes to [facility] in [place]    Follows with Dr Domonique Messina  On esterase     Continue with the low-fat, low-cholesterol diet,  I recommended Mediterranean diet, which include fish, chicken, vegetables and olive oil  Exercise daily for 30 minutes at least 3 times a week  Continue home medications    Gastroesophageal reflux disease  Continue  current medications  Include spicy foods do not eat late at night  Do not wear tight fitting clothes  Exercise daily    Anxiety with panic  On as needed lorazepam  Last prescription February 24, 2023  For 30 pills    6 mm Pul module non smoker  CT Chest repeat March 2024 repeat     OARRS:  No data recorded  I have personally reviewed the OARRS report for Yasmine Ortiz. I have considered the risks of abuse, dependence, addiction and diversion and I believe that it is clinically appropriate for Yasmine Ortiz to be prescribed this medication    Is the patient prescribed a combination of a benzodiazepine and opioid?  No    Last Urine Drug Screen / ordered today: Yes  Recent Results (from the past 8760 hour(s))   Drug Screen, Urine With Reflex to Confirmation    Collection Time: 08/04/23 10:42 AM   Result Value Ref Range    DRUG SCREEN COMMENT URINE SEE BELOW     Amphetamine Screen, Urine PRESUMPTIVE NEGATIVE NEGATIVE    Barbiturate Screen, Urine PRESUMPTIVE NEGATIVE NEGATIVE    BENZODIAZEPINE (PRESENCE) IN URINE BY SCREEN METHOD PRESUMPTIVE NEGATIVE NEGATIVE    Cannabinoid Screen, Urine PRESUMPTIVE NEGATIVE NEGATIVE    Cocaine Screen, Urine PRESUMPTIVE NEGATIVE NEGATIVE    Fentanyl, Ur PRESUMPTIVE NEGATIVE NEGATIVE    Methadone Screen, Urine PRESUMPTIVE NEGATIVE NEGATIVE    Opiate Screen, Urine PRESUMPTIVE NEGATIVE NEGATIVE    Oxycodone Screen, Ur PRESUMPTIVE NEGATIVE NEGATIVE    PCP Screen, Urine PRESUMPTIVE NEGATIVE NEGATIVE     Results are as expected.     Controlled Substance Agreement:  Date of the Last Agreement: August 4, 2023  Reviewed Controlled Substance Agreement including but not limited to the benefits, risks, and alternatives to treatment with a Controlled Substance medication(s).    Benzodiazepines:  What is the patient's goal of therapy?  Anxiety panic  Is this being achieved with current treatment?  Yes    SANDRA-7:  No data recorded    Activities of Daily Living:   Is your overall impression that this  patient is benefiting (symptom reduction outweighs side effects) from benzodiazepine therapy? Yes     1. Physical Functioning: Better  2. Family Relationship: Better  3. Social Relationship: Better  4. Mood: Better  5. Sleep Patterns: Better  6. Overall Function: Better  Problem List Items Addressed This Visit    None  Brenden Milner MD

## 2024-02-13 ENCOUNTER — TELEMEDICINE (OUTPATIENT)
Dept: PRIMARY CARE | Facility: CLINIC | Age: 79
End: 2024-02-13
Payer: MEDICARE

## 2024-02-13 ENCOUNTER — TELEPHONE (OUTPATIENT)
Dept: PRIMARY CARE | Facility: CLINIC | Age: 79
End: 2024-02-13

## 2024-02-13 DIAGNOSIS — E78.5 HYPERLIPIDEMIA, UNSPECIFIED HYPERLIPIDEMIA TYPE: ICD-10-CM

## 2024-02-13 DIAGNOSIS — I10 PRIMARY HYPERTENSION: ICD-10-CM

## 2024-02-13 DIAGNOSIS — J01.00 ACUTE NON-RECURRENT MAXILLARY SINUSITIS: Primary | ICD-10-CM

## 2024-02-13 PROCEDURE — 1126F AMNT PAIN NOTED NONE PRSNT: CPT | Performed by: INTERNAL MEDICINE

## 2024-02-13 PROCEDURE — 1036F TOBACCO NON-USER: CPT | Performed by: INTERNAL MEDICINE

## 2024-02-13 PROCEDURE — 99213 OFFICE O/P EST LOW 20 MIN: CPT | Performed by: INTERNAL MEDICINE

## 2024-02-13 PROCEDURE — 1157F ADVNC CARE PLAN IN RCRD: CPT | Performed by: INTERNAL MEDICINE

## 2024-02-13 PROCEDURE — 1159F MED LIST DOCD IN RCRD: CPT | Performed by: INTERNAL MEDICINE

## 2024-02-13 RX ORDER — PREDNISONE 20 MG/1
40 TABLET ORAL DAILY
Qty: 10 TABLET | Refills: 0 | Status: SHIPPED | OUTPATIENT
Start: 2024-02-13 | End: 2024-02-18

## 2024-02-13 NOTE — PROGRESS NOTES
Subjective   Yasmine Ortiz is a 78 y.o. female who presents for patient is here for a virtual visit sinus pressure  .  HPI  Yasmine is a 78-year-old female with known history of hypertension hypothyroidism, dyslipidemia patient takes medication prescribed.  Patient denies chest pain shortness of breath fever chill nausea vomiting constipation diarrhea dysuria urgency frequency.  Patient endorses sinus congestion and sinus pressure, she is currently on Augmentin on day 5 out of 10, she claims that his symptoms may have improved a little bit but she still having problems breathing through the right nare with sinus congestion and a mild headache.  She denies fever chills diaphoresis and no nasal discharge noted.    Review of Systems  10 system review pertinent as above  Objective   Virtual visit  There were no vitals taken for this visit.     Physical Exam  Virtual visit      Assessment/Plan     Virtual visit  Acute maxillary sinusitis  Initiated Augmentin February 9, 2024  On day 5 out of 10  Sinus pressure on the left has improved  Sinus pressure on the right is the same  Patient completed steam treatment  Still complaining of discomfort on the right  There is no fevers or chills or diaphoresis  Very little discharge of any  Mostly due to dryness  Continue Augmentin as prescribed  Will add prednisone 40 mg daily for 5 days  If not better in 5 days  Will refer to ear nose and throat  Continue with  Fluids and rest      Hypertension  No added salt diet, do not and salt to your food  Try to exercise every other day for 30 minutes  Continue current medications  Metoprolol 50 mg daily  25 mg in addition for palpitation when needed    Sinusitis  On pseudoephedrine  Since patient wa 29 years of age  Tolerating no side effects  No palpitation     Hypothyroidism  Continue current medications  Report any changes such as weight gain or weight loss  Continue to exercise regularly  Goes to [facility] in [place]    Follows  with Dr Domonique Messina  On esterase     Continue with the low-fat, low-cholesterol diet,  I recommended Mediterranean diet, which include fish, chicken, vegetables and olive oil  Exercise daily for 30 minutes at least 3 times a week  Continue home medications    Gastroesophageal reflux disease  Continue current medications  Include spicy foods do not eat late at night  Do not wear tight fitting clothes  Exercise daily    Anxiety with panic  On as needed lorazepam  Last prescription February 24, 2023  For 30 pills    6 mm Pul module non smoker  CT Chest repeat March 2024 repeat     OARRS:  No data recorded  I have personally reviewed the OARRS report for Yasmine Ortiz. I have considered the risks of abuse, dependence, addiction and diversion and I believe that it is clinically appropriate for Yasmine Ortiz to be prescribed this medication    Is the patient prescribed a combination of a benzodiazepine and opioid?  No    Last Urine Drug Screen / ordered today: Yes  Recent Results (from the past 8760 hour(s))   Drug Screen, Urine With Reflex to Confirmation    Collection Time: 08/04/23 10:42 AM   Result Value Ref Range    DRUG SCREEN COMMENT URINE SEE BELOW     Amphetamine Screen, Urine PRESUMPTIVE NEGATIVE NEGATIVE    Barbiturate Screen, Urine PRESUMPTIVE NEGATIVE NEGATIVE    BENZODIAZEPINE (PRESENCE) IN URINE BY SCREEN METHOD PRESUMPTIVE NEGATIVE NEGATIVE    Cannabinoid Screen, Urine PRESUMPTIVE NEGATIVE NEGATIVE    Cocaine Screen, Urine PRESUMPTIVE NEGATIVE NEGATIVE    Fentanyl, Ur PRESUMPTIVE NEGATIVE NEGATIVE    Methadone Screen, Urine PRESUMPTIVE NEGATIVE NEGATIVE    Opiate Screen, Urine PRESUMPTIVE NEGATIVE NEGATIVE    Oxycodone Screen, Ur PRESUMPTIVE NEGATIVE NEGATIVE    PCP Screen, Urine PRESUMPTIVE NEGATIVE NEGATIVE     Results are as expected.     Controlled Substance Agreement:  Date of the Last Agreement: August 4, 2023  Reviewed Controlled Substance Agreement including but not limited to the benefits, risks,  and alternatives to treatment with a Controlled Substance medication(s).    Benzodiazepines:  What is the patient's goal of therapy?  Anxiety panic  Is this being achieved with current treatment?  Yes    SANDRA-7:  No data recorded    Activities of Daily Living:   Is your overall impression that this patient is benefiting (symptom reduction outweighs side effects) from benzodiazepine therapy? Yes     1. Physical Functioning: Better  2. Family Relationship: Better  3. Social Relationship: Better  4. Mood: Better  5. Sleep Patterns: Better  6. Overall Function: Better  Problem List Items Addressed This Visit       Hyperlipidemia    Hypertension     Other Visit Diagnoses       Acute non-recurrent maxillary sinusitis    -  Primary    Relevant Medications    predniSONE (Deltasone) 20 mg tablet          Brenden Milner MD

## 2024-02-20 NOTE — PROGRESS NOTES
Physical Therapy  Physical Therapy Treatment Note    Patient Name: Yasmine Ortiz  MRN: [id number]  Today's Date: 2024  Time Calculation  Start Time: 830  Stop Time: 930  Time Calculation (min): 60 min    Referring provider: Dr. Noel Mercado  Onset date: 23    Insurance:  Visit number: 10 [facility]  Authorization info: no auth needed  Insurance Type: Medicare  onset 23   Medicare Certification Period: Beginnin2023 Ending: 10/ 10/ 2023    Medicare Recertification Period:  Beginning: 10/24/23 Endin23    Current Problem  1. Right shoulder pain, unspecified chronicity            General     Precautions   R shoulder replacement in 2016.     Subjective:   Subjective   Patient reports that she had a biopsy of her back that prevented her from doing exercises per  For 2 weeks then she ended up having a sinus infection for the last couple of weeks so haven't done much.    Pain   0/10, more of a tightness and soreness    Objective:   Objective   23 RECHECK  palpation: mild TTP along R upper trap and pec minor  UE ROM: WNL B     UE MMT: R L  shoulder flex 5 / 5   scapt 4+/ 5   abd 4+ / 5   ER 4/ 5   IR 4+ / 5  Bicep 5 / 5  Tricep 5 / 5      23:  palpation: mild TTP along R upper trap     UE ROM: WNL B, no pain      UE MMT: R L  shoulder flex 5 / 5   scapt 4+ / 5   abd 4+ / 5   ER 4 / 5   IR 4 / 5  Bicep 5 / 5  Tricep 5 / 5        23  posture: RS, FH, TK      palpation: no TTP, pain located in anterior deltoid, bicep and supraspinatus and infraspinatus      scapular control: WNL     UE ROM: WNL B, no pain     UE MMT: R L     shoulder flex 4 / 5   scapt 4 / 5   abd 4 / 5   ER 4- / 5   IR 4 / 5  Bicep 4 / 5  Tricep 4+ / 5  .   Outcome Measures   Quick Dash score: 15.91, 23 15.91     Treatments:   TE: (38')  UBE x 6 min  Therma probe  Review all HEP  Lat pull with Black band (pt given this)    Estim: 10'  Thermaprobe: 5 bar heat + stim level 8 x10' to R UT    OP Education:    Access Code: [email or web address] (updated 2/21/24)    Assessment:       Patient has not been able to do her exercises at home and needed a refresher,  She has a good understanding of her HEP.  She is dropping reps to 10 and building back up to 15 when she can.  She will benefit from a fu in 4wks to assess continued progress.      Plan:     c.w. progress RTC strengthening; follow up in 4 weeks and assess how HEP is going.    Planned interventions include: cryotherapy, education/instruction, home program, hot pack, manual therapy, neuromuscular re-education and therapeutic exercises.   Frequency and duration: 1 time(s) a week, for 8-10 weeks.   Potential to achieve rehab goals is good       Plan of care was developed with input and agreement by the patient.      Goals:     Active       PT Problem       PT Goal 1       Start:  10/24/23    Expected End:  12/24/23       Pt will be independent with ADLs including watering her plants without pain.  Pt will be independent with HEP  Pt will improve strength of R shoulder to 5/5 or to = L  Pt will report +4 on GROC                    Radha Hsieh, PTA

## 2024-02-21 ENCOUNTER — TREATMENT (OUTPATIENT)
Dept: PHYSICAL THERAPY | Facility: CLINIC | Age: 79
End: 2024-02-21
Payer: MEDICARE

## 2024-02-21 DIAGNOSIS — M25.511 RIGHT SHOULDER PAIN, UNSPECIFIED CHRONICITY: Primary | ICD-10-CM

## 2024-02-21 PROCEDURE — 97032 APPL MODALITY 1+ESTIM EA 15: CPT | Mod: GP,CQ

## 2024-02-21 PROCEDURE — 97110 THERAPEUTIC EXERCISES: CPT | Mod: GP,CQ

## 2024-02-26 ENCOUNTER — OFFICE VISIT (OUTPATIENT)
Dept: PRIMARY CARE | Facility: CLINIC | Age: 79
End: 2024-02-26
Payer: MEDICARE

## 2024-02-26 VITALS
WEIGHT: 137 LBS | HEIGHT: 65 IN | TEMPERATURE: 97.9 F | DIASTOLIC BLOOD PRESSURE: 86 MMHG | BODY MASS INDEX: 22.82 KG/M2 | SYSTOLIC BLOOD PRESSURE: 140 MMHG | HEART RATE: 88 BPM | RESPIRATION RATE: 16 BRPM

## 2024-02-26 DIAGNOSIS — E78.5 HYPERLIPIDEMIA, UNSPECIFIED HYPERLIPIDEMIA TYPE: ICD-10-CM

## 2024-02-26 DIAGNOSIS — N30.00 ACUTE CYSTITIS WITHOUT HEMATURIA: Primary | ICD-10-CM

## 2024-02-26 DIAGNOSIS — I10 PRIMARY HYPERTENSION: ICD-10-CM

## 2024-02-26 DIAGNOSIS — E55.9 VITAMIN D DEFICIENCY: ICD-10-CM

## 2024-02-26 LAB
APPEARANCE UR: ABNORMAL
BILIRUB UR QL STRIP: ABNORMAL
COLOR UR: ABNORMAL
GLUCOSE UR STRIP-MCNC: ABNORMAL MG/DL
HGB UR QL STRIP: ABNORMAL
KETONES UR STRIP-MCNC: ABNORMAL MG/DL
LEUKOCYTE ESTERASE UR QL STRIP: ABNORMAL
NITRITE UR QL STRIP: POSITIVE
PH UR STRIP: 5.5 [PH]
PROT UR STRIP-MCNC: ABNORMAL MG/DL
SP GR UR STRIP.AUTO: 1.02
UROBILINOGEN UR STRIP-ACNC: 1 E.U./DL

## 2024-02-26 PROCEDURE — 1159F MED LIST DOCD IN RCRD: CPT | Performed by: INTERNAL MEDICINE

## 2024-02-26 PROCEDURE — 87086 URINE CULTURE/COLONY COUNT: CPT | Performed by: INTERNAL MEDICINE

## 2024-02-26 PROCEDURE — 81003 URINALYSIS AUTO W/O SCOPE: CPT | Performed by: INTERNAL MEDICINE

## 2024-02-26 PROCEDURE — 3079F DIAST BP 80-89 MM HG: CPT | Performed by: INTERNAL MEDICINE

## 2024-02-26 PROCEDURE — 1126F AMNT PAIN NOTED NONE PRSNT: CPT | Performed by: INTERNAL MEDICINE

## 2024-02-26 PROCEDURE — 99214 OFFICE O/P EST MOD 30 MIN: CPT | Performed by: INTERNAL MEDICINE

## 2024-02-26 PROCEDURE — 1157F ADVNC CARE PLAN IN RCRD: CPT | Performed by: INTERNAL MEDICINE

## 2024-02-26 PROCEDURE — 3077F SYST BP >= 140 MM HG: CPT | Performed by: INTERNAL MEDICINE

## 2024-02-26 PROCEDURE — 1036F TOBACCO NON-USER: CPT | Performed by: INTERNAL MEDICINE

## 2024-02-26 RX ORDER — CIPROFLOXACIN 500 MG/1
500 TABLET ORAL 2 TIMES DAILY
Qty: 20 TABLET | Refills: 0 | Status: SHIPPED | OUTPATIENT
Start: 2024-02-26 | End: 2024-03-07

## 2024-02-26 ASSESSMENT — PAIN SCALES - GENERAL: PAINLEVEL: 0-NO PAIN

## 2024-02-26 ASSESSMENT — ENCOUNTER SYMPTOMS
DEPRESSION: 0
LOSS OF SENSATION IN FEET: 0
OCCASIONAL FEELINGS OF UNSTEADINESS: 0

## 2024-02-26 NOTE — PROGRESS NOTES
Subjective   Yasmine Ortiz is a 78 y.o. female who presents for patient is here for a UTI suspect.  HPI  Yasmine is a 77-year-old female with known history of hypertension hypothyroidism, dyslipidemia patient takes medication prescribed.  Patient denies chest pain shortness of breath fever chill nausea vomiting constipation diarrhea dysuria urgency frequency.  Patient is here for follow-up and fasting blood work. Requested refills for Mupirocin and Synthroid. Pressure urination pressure in abdomen bloody urine, historically that's her presentation.      Review of Systems  10 system review pertinent as above  Objective     Visit Vitals  /86   Pulse 88   Temp 36.6 °C (97.9 °F)   Resp 16      Physical Exam    HEENT: Atraumatic normocephalic the pupils are equal and round and reactive to light the sclerae nonicteric extraocular motion are intact.  Neck: Is supple without JVD no carotid bruits the trachea is midline there are no masses pulses are equal and bilateral with normal upstroke.  Skin: Normal.  Skin good texture.  Moist.  Good turgor.  No lesions, no rashes.  Lymph: No lymphadenopathy appreciated, no masses, no lesions  Lungs: Are clear to auscultation and percussion, good breath sounds bilaterally, no rhonchi, no wheezing, good diaphragmatic excursion.  Heart: Normal rate and normal rhythm S1, S2, no S3, no gallop, murmur or rub.  Abdomen: Soft, nontender, no organomegaly, good bowel sounds.    Extremities: Full range of motion, good pulses bilateral.  No cyanosis, no clubbing or edema.  Neuro: Cranial nerves II-XII are grossly intact there is no sensory or motor deficits.  Able to move all extremities.      Assessment/Plan     Suspect UTI  With Hematuria  Symptomatic pelvis  No fever no chills  With Dysuria    Last colonoscopy 2017 asymptomatic  Mammogram July 2022  Bone density 2021    Heart Palpitatuion  Metoprolol tartarated 25 mg daily  Doing well  No new complaints    Hypertension  No added salt diet,  do not and salt to your food  Try to exercise every other day for 30 minutes  Continue current medications  Metoprolol 50 mg daily  25 mg in addition for palpitation when needed    Sinusitis  On pseudoephedrine  Since patient wa 29 years of age  Tolerating no side effects  No palpitation     Hypothyroidism  Continue current medications  Report any changes such as weight gain or weight loss  Continue to exercise regularly  Goes to [facility] in [place]    Follows with Dr Domonique Messina  On esterase     Continue with the low-fat, low-cholesterol diet,  I recommended Mediterranean diet, which include fish, chicken, vegetables and olive oil  Exercise daily for 30 minutes at least 3 times a week  Continue home medications    Gastroesophageal reflux disease  Continue current medications  Include spicy foods do not eat late at night  Do not wear tight fitting clothes  Exercise daily    Anxiety with panic  On as needed lorazepam  Last prescription February 24, 2023  For 30 pills    6 mm Pul module non smoker  CT Chest repeat March 2024 repeat     OARRS:  No data recorded  I have personally reviewed the OARRS report for Yasmine Ortiz. I have considered the risks of abuse, dependence, addiction and diversion and I believe that it is clinically appropriate for Yasmine Ortiz to be prescribed this medication    Is the patient prescribed a combination of a benzodiazepine and opioid?  No    Last Urine Drug Screen / ordered today: Yes  Recent Results (from the past 8760 hour(s))   Drug Screen, Urine With Reflex to Confirmation    Collection Time: 08/04/23 10:42 AM   Result Value Ref Range    DRUG SCREEN COMMENT URINE SEE BELOW     Amphetamine Screen, Urine PRESUMPTIVE NEGATIVE NEGATIVE    Barbiturate Screen, Urine PRESUMPTIVE NEGATIVE NEGATIVE    BENZODIAZEPINE (PRESENCE) IN URINE BY SCREEN METHOD PRESUMPTIVE NEGATIVE NEGATIVE    Cannabinoid Screen, Urine PRESUMPTIVE NEGATIVE NEGATIVE    Cocaine Screen, Urine PRESUMPTIVE  NEGATIVE NEGATIVE    Fentanyl, Ur PRESUMPTIVE NEGATIVE NEGATIVE    Methadone Screen, Urine PRESUMPTIVE NEGATIVE NEGATIVE    Opiate Screen, Urine PRESUMPTIVE NEGATIVE NEGATIVE    Oxycodone Screen, Ur PRESUMPTIVE NEGATIVE NEGATIVE    PCP Screen, Urine PRESUMPTIVE NEGATIVE NEGATIVE     Results are as expected.     Controlled Substance Agreement:  Date of the Last Agreement: August 4, 2023  Reviewed Controlled Substance Agreement including but not limited to the benefits, risks, and alternatives to treatment with a Controlled Substance medication(s).    Benzodiazepines:  What is the patient's goal of therapy?  Anxiety panic  Is this being achieved with current treatment?  Yes    SANDRA-7:  No data recorded    Activities of Daily Living:   Is your overall impression that this patient is benefiting (symptom reduction outweighs side effects) from benzodiazepine therapy? Yes     1. Physical Functioning: Better  2. Family Relationship: Better  3. Social Relationship: Better  4. Mood: Better  5. Sleep Patterns: Better  6. Overall Function: Better  Problem List Items Addressed This Visit    None  Visit Diagnoses       Acute cystitis without hematuria    -  Primary    Relevant Orders    POCT UA (Automated) docked device        Brenden Milner MD

## 2024-02-27 ENCOUNTER — TELEPHONE (OUTPATIENT)
Dept: OBSTETRICS AND GYNECOLOGY | Facility: CLINIC | Age: 79
End: 2024-02-27
Payer: MEDICARE

## 2024-02-27 DIAGNOSIS — Z12.31 ENCOUNTER FOR SCREENING MAMMOGRAM FOR BREAST CANCER: Primary | ICD-10-CM

## 2024-03-01 LAB
BACTERIA UR CULT: ABNORMAL
BACTERIA UR CULT: ABNORMAL

## 2024-03-21 NOTE — PROGRESS NOTES
Physical Therapy  Physical Therapy Treatment Note    Patient Name: Yasmine Ortiz  MRN: [id number]  Today's Date: 3/26/2024  Time Calculation  Start Time: 1000  Stop Time: 1045  Time Calculation (min): 45 min    PT Therapeutic Procedures Time Entry  Therapeutic Exercise Time Entry: 45            Referring provider: Dr. Noel Mercado  Onset date: 23    Insurance:  Visit number: 2 of [place]  Authorization info: no auth needed  Insurance Type: Payor: AETNA MEDICARE / Plan: AETNA MEDICARE ASSURE / Product Type: *No Product type* /    onset 23   Medicare Certification Period: Beginnin2023 Ending: 10/ 10/ 2023    Medicare Recertification Period:  Beginning: 10/24/23 Endin23  [facility] recert: 24--24    Current Problem  1. Right shoulder pain, unspecified chronicity  Follow Up In Physical Therapy        General     Precautions   R shoulder replacement in 2016.     Subjective:   Subjective     Feb and Mar were rough.  She had a sinus infection and then UTI and then her gut was all messed up.  She notices she has not started building any muscle tone in her UE.  She denies muscle pain.     Pain   0/10    Objective:   Objective   3/25:  palpation: no tenderness  UE ROM: WNL B     UE MMT: R L  shoulder flex 4+ / 5   scapt 4+/ 5   abd 4+ / 5   ER 4/5   IR 4/5  Bicep 5 / 5  Tricep 4+ / 5    23 RECHECK  palpation: mild TTP along R upper trap and pec minor  UE ROM: WNL B     UE MMT: R L  shoulder flex 5 / 5   scapt 4+/ 5   abd 4+ / 5   ER 4/ 5   IR 4+ / 5  Bicep 5 / 5  Tricep 5 / 5      23:  palpation: mild TTP along R upper trap     UE ROM: WNL B, no pain      UE MMT: R L  shoulder flex 5 / 5   scapt 4+ / 5   abd 4+ / 5   ER 4 / 5   IR 4 / 5  Bicep 5 / 5  Tricep 5 / 5        23  posture: RS, FH, TK      palpation: no TTP, pain located in anterior deltoid, bicep and supraspinatus and infraspinatus      scapular control: WNL     UE ROM: WNL B, no pain     UE MMT: R L     shoulder flex 4 /  5   scapt 4 / 5   abd 4 / 5   ER 4- / 5   IR 4 / 5  Bicep 4 / 5  Tricep 4+ / 5  .   Outcome Measures   Quick Dash score: 15.91, 11/27/23 15.91     Treatments:   TE: (38')  UBE x 6 min  recheck  Review all HEP: not doing shoulder press, standing W's, tricep extensions   SA Punch: correct form: cues to keep elbow extended   Sidelying shoulder ER 3# 2x10 R, cues for scapular retraction and add squeeze, this helps with GH positioning to prevent popping  Shoulder press against the wall 2# 2x10, single arm  Wall W's with 2# 2 x 10, single arm, on the wall    OP Education:   Access Code: [email or web address] (updated 2/21/24)    Assessment:       Alexandra tx well.  She reports fatigue with updated HEP today.  She will benefit from 1 more follow up in 4wks to assess overall response to HEP as her compliance lagged d.t. illness this past month.      Plan:     c.w. progress RTC strengthening; follow up in 4 weeks and assess how HEP is going. Anticipate d.c NV.      Planned interventions include: cryotherapy, education/instruction, home program, hot pack, manual therapy, neuromuscular re-education and therapeutic exercises.   Frequency and duration: 1 time(s) a week, for 8-10 weeks.   Potential to achieve rehab goals is good       Plan of care was developed with input and agreement by the patient.      Goals:     Active       PT Problem       PT Goal 1       Start:  10/24/23    Expected End:  12/24/23       Pt will be independent with ADLs including watering her plants without pain.  Pt will be independent with HEP  Pt will improve strength of R shoulder to 5/5 or to = L  Pt will report +4 on GROC                    Dania Ku, PT

## 2024-03-26 ENCOUNTER — APPOINTMENT (OUTPATIENT)
Dept: PRIMARY CARE | Facility: CLINIC | Age: 79
End: 2024-03-26
Payer: MEDICARE

## 2024-03-26 ENCOUNTER — TREATMENT (OUTPATIENT)
Dept: PHYSICAL THERAPY | Facility: CLINIC | Age: 79
End: 2024-03-26
Payer: MEDICARE

## 2024-03-26 DIAGNOSIS — M25.511 RIGHT SHOULDER PAIN, UNSPECIFIED CHRONICITY: Primary | ICD-10-CM

## 2024-03-26 PROCEDURE — 97110 THERAPEUTIC EXERCISES: CPT | Mod: GP

## 2024-03-27 ENCOUNTER — HOSPITAL ENCOUNTER (OUTPATIENT)
Dept: RADIOLOGY | Facility: HOSPITAL | Age: 79
Discharge: HOME | End: 2024-03-27
Payer: MEDICARE

## 2024-03-27 ENCOUNTER — OFFICE VISIT (OUTPATIENT)
Dept: ORTHOPEDIC SURGERY | Facility: HOSPITAL | Age: 79
End: 2024-03-27
Payer: MEDICARE

## 2024-03-27 DIAGNOSIS — M25.511 RIGHT SHOULDER PAIN, UNSPECIFIED CHRONICITY: ICD-10-CM

## 2024-03-27 PROCEDURE — 1159F MED LIST DOCD IN RCRD: CPT | Performed by: ORTHOPAEDIC SURGERY

## 2024-03-27 PROCEDURE — 73030 X-RAY EXAM OF SHOULDER: CPT | Mod: RIGHT SIDE | Performed by: STUDENT IN AN ORGANIZED HEALTH CARE EDUCATION/TRAINING PROGRAM

## 2024-03-27 PROCEDURE — 99214 OFFICE O/P EST MOD 30 MIN: CPT | Performed by: ORTHOPAEDIC SURGERY

## 2024-03-27 PROCEDURE — 1157F ADVNC CARE PLAN IN RCRD: CPT | Performed by: ORTHOPAEDIC SURGERY

## 2024-03-27 PROCEDURE — 1036F TOBACCO NON-USER: CPT | Performed by: ORTHOPAEDIC SURGERY

## 2024-03-27 PROCEDURE — 73030 X-RAY EXAM OF SHOULDER: CPT | Mod: RT

## 2024-03-27 NOTE — PROGRESS NOTES
Here for follow-up on right shoulder arthroplasty.  She had been having some instability symptoms but has been doing strengthening exercises with the supervision of her therapist.  She had a few weeks where she could go to therapy which she has resumed and she feels it is helping.  Still has some weakness in abduction.    Right shoulder external rotation motor power 5/5, internal rotation 5/5 abduction 4/5.    X-rays show preservation of the subacromial space glenohumeral components are in appropriate position with no evidence of loss of fixation.    Right shoulder glenohumeral arthritis    Patient is progressing well with increasing strengthening.  I like to check her strength again in 2 to 3 months.  This was dictated using voice recognition software and not corrected for grammatical or spelling errors.

## 2024-03-28 ENCOUNTER — OFFICE VISIT (OUTPATIENT)
Dept: PRIMARY CARE | Facility: CLINIC | Age: 79
End: 2024-03-28
Payer: MEDICARE

## 2024-03-28 DIAGNOSIS — N30.00 ACUTE CYSTITIS WITHOUT HEMATURIA: Primary | ICD-10-CM

## 2024-03-28 LAB
APPEARANCE UR: CLEAR
BILIRUB UR QL STRIP: NEGATIVE
COLOR UR: YELLOW
GLUCOSE UR STRIP-MCNC: NEGATIVE MG/DL
HGB UR QL STRIP: NEGATIVE
KETONES UR STRIP-MCNC: NEGATIVE MG/DL
LEUKOCYTE ESTERASE UR QL STRIP: ABNORMAL
NITRITE UR QL STRIP: NEGATIVE
PH UR STRIP: 6 [PH]
PROT UR STRIP-MCNC: NEGATIVE MG/DL
SP GR UR STRIP.AUTO: 1.01
UROBILINOGEN UR STRIP-ACNC: 0.2 E.U./DL

## 2024-03-28 PROCEDURE — 1159F MED LIST DOCD IN RCRD: CPT | Performed by: INTERNAL MEDICINE

## 2024-03-28 PROCEDURE — 1157F ADVNC CARE PLAN IN RCRD: CPT | Performed by: INTERNAL MEDICINE

## 2024-04-22 RX ORDER — TRIAMCINOLONE ACETONIDE 1 MG/G
CREAM TOPICAL
COMMUNITY
Start: 2024-01-24 | End: 2024-06-05 | Stop reason: WASHOUT

## 2024-04-22 NOTE — PROGRESS NOTES
Physical Therapy  Physical Therapy Treatment Note    Patient Name: Yasmine Ortiz  MRN: [id number]  Today's Date: 2024  Time Calculation  Start Time: 1045  Stop Time: 1130  Time Calculation (min): 45 min    PT Therapeutic Procedures Time Entry  Manual Therapy Time Entry: 15  Therapeutic Exercise Time Entry: 30            Referring provider: Dr. Noel Mercado  Onset date: 23    Insurance:  Visit number: 3 of MN  Authorization info: no auth needed  Insurance Type: Payor: AETNA MEDICARE / Plan: AETNA MEDICARE ASSURE / Product Type: *No Product type* /    onset 23   Medicare Certification Period: Beginnin2023 Ending: 10/ 10/ 2023    Medicare Recertification Period:  Beginning: 10/24/23 Endin23  [facility] recert: 24--24    Current Problem  1. Right shoulder pain, unspecified chronicity          General     Precautions   R shoulder replacement in 2016.     Subjective:   Subjective     Some UT tightness after her exercises.  She had some adductor and back issues, might go to her doctor and get a PT referral for it.      Pain   0/10    Objective:   Objective   3/25:  palpation: no tenderness  UE ROM: WNL B     UE MMT: R L  shoulder flex 4+ / 5   scapt 4+/ 5   abd 4+ / 5   ER 4/5   IR 4/5  Bicep 5 / 5  Tricep 4+ / 5    23 RECHECK  palpation: mild TTP along R upper trap and pec minor  UE ROM: WNL B     UE MMT: R L  shoulder flex 5 / 5   scapt 4+/ 5   abd 4+ / 5   ER 4/ 5   IR 4+ / 5  Bicep 5 / 5  Tricep 5 / 5      23:  palpation: mild TTP along R upper trap     UE ROM: WNL B, no pain      UE MMT: R L  shoulder flex 5 / 5   scapt 4+ / 5   abd 4+ / 5   ER 4 / 5   IR 4 / 5  Bicep 5 / 5  Tricep 5 / 5        23  posture: RS, FH, TK      palpation: no TTP, pain located in anterior deltoid, bicep and supraspinatus and infraspinatus      scapular control: WNL     UE ROM: WNL B, no pain     UE MMT: R L     shoulder flex 4 / 5   scapt 4 / 5   abd 4 / 5   ER 4- / 5   IR 4 / 5  Bicep  4 / 5  Tricep 4+ / 5  .   Outcome Measures   Quick Dash score: 15.91, 11/27/23 15.91     Treatments:   TE: (30')  UBE x 6 min  Review HEP verbally  Supine SA punch 2#, 3x10/UE  Wall W into OH shoulder press 2# 2x10    Manual: 15  DTM to B UT     OP Education:   Access Code: [email or web address] (updated 2/21/24)    Assessment:       Alexandra tx well.  She reports fatigue with exercises. She has better technique with W's when against the wall.  At this time she will be independent with HEP.      Plan:      D/c to HEP.      Number of attended visits: 10    Reason referred to physical therapy:  Shoulder pain    Therapy addressed the following problems/issues:    Shoulder pain, weakness, decreased function.     Reason for discharge:    +achieved all/most significant goals    Dania Ku, PT  Plan of care was developed with input and agreement by the patient.      Goals:     Active       PT Problem       PT Goal 1       Start:  10/24/23    Expected End:  12/24/23       Pt will be independent with ADLs including watering her plants without pain.  Pt will be independent with HEP  Pt will improve strength of R shoulder to 5/5 or to = L  Pt will report +4 on GROC                    Dania Ku, PT

## 2024-04-23 ENCOUNTER — OFFICE VISIT (OUTPATIENT)
Dept: PRIMARY CARE | Facility: CLINIC | Age: 79
End: 2024-04-23
Payer: MEDICARE

## 2024-04-23 ENCOUNTER — HOSPITAL ENCOUNTER (OUTPATIENT)
Dept: RADIOLOGY | Facility: CLINIC | Age: 79
Discharge: HOME | End: 2024-04-23
Payer: MEDICARE

## 2024-04-23 ENCOUNTER — TREATMENT (OUTPATIENT)
Dept: PHYSICAL THERAPY | Facility: CLINIC | Age: 79
End: 2024-04-23
Payer: MEDICARE

## 2024-04-23 VITALS
BODY MASS INDEX: 25.86 KG/M2 | HEIGHT: 61 IN | SYSTOLIC BLOOD PRESSURE: 130 MMHG | WEIGHT: 137 LBS | TEMPERATURE: 96.6 F | DIASTOLIC BLOOD PRESSURE: 80 MMHG

## 2024-04-23 DIAGNOSIS — R10.84 GENERALIZED ABDOMINAL PAIN: ICD-10-CM

## 2024-04-23 DIAGNOSIS — R10.84 GENERALIZED ABDOMINAL PAIN: Primary | ICD-10-CM

## 2024-04-23 DIAGNOSIS — M25.511 RIGHT SHOULDER PAIN, UNSPECIFIED CHRONICITY: Primary | ICD-10-CM

## 2024-04-23 PROCEDURE — 1160F RVW MEDS BY RX/DR IN RCRD: CPT | Performed by: PHYSICIAN ASSISTANT

## 2024-04-23 PROCEDURE — 3075F SYST BP GE 130 - 139MM HG: CPT | Performed by: PHYSICIAN ASSISTANT

## 2024-04-23 PROCEDURE — 74018 RADEX ABDOMEN 1 VIEW: CPT

## 2024-04-23 PROCEDURE — 99214 OFFICE O/P EST MOD 30 MIN: CPT | Performed by: PHYSICIAN ASSISTANT

## 2024-04-23 PROCEDURE — 1125F AMNT PAIN NOTED PAIN PRSNT: CPT | Performed by: PHYSICIAN ASSISTANT

## 2024-04-23 PROCEDURE — 1036F TOBACCO NON-USER: CPT | Performed by: PHYSICIAN ASSISTANT

## 2024-04-23 PROCEDURE — 1159F MED LIST DOCD IN RCRD: CPT | Performed by: PHYSICIAN ASSISTANT

## 2024-04-23 PROCEDURE — 1157F ADVNC CARE PLAN IN RCRD: CPT | Performed by: PHYSICIAN ASSISTANT

## 2024-04-23 PROCEDURE — 3079F DIAST BP 80-89 MM HG: CPT | Performed by: PHYSICIAN ASSISTANT

## 2024-04-23 PROCEDURE — 97140 MANUAL THERAPY 1/> REGIONS: CPT | Mod: GP

## 2024-04-23 PROCEDURE — 74018 RADEX ABDOMEN 1 VIEW: CPT | Performed by: RADIOLOGY

## 2024-04-23 PROCEDURE — 97110 THERAPEUTIC EXERCISES: CPT | Mod: GP

## 2024-04-23 ASSESSMENT — ENCOUNTER SYMPTOMS
FEVER: 0
ABDOMINAL PAIN: 1
MYALGIAS: 0
NERVOUS/ANXIOUS: 0
ABDOMINAL DISTENTION: 0
WHEEZING: 0
NAUSEA: 0
EYE DISCHARGE: 0
ARTHRALGIAS: 0
SORE THROAT: 0
FACIAL SWELLING: 0
CONFUSION: 0
SLEEP DISTURBANCE: 0
FREQUENCY: 0
DIFFICULTY URINATING: 0
WEAKNESS: 0
DIARRHEA: 0
POLYPHAGIA: 0
TREMORS: 0
COUGH: 0
ANAL BLEEDING: 0
JOINT SWELLING: 0
HEADACHES: 0
NUMBNESS: 0
FATIGUE: 0
CONSTIPATION: 0
CHILLS: 0
CHEST TIGHTNESS: 0
EYE PAIN: 0
LOSS OF SENSATION IN FEET: 0
OCCASIONAL FEELINGS OF UNSTEADINESS: 0
SHORTNESS OF BREATH: 0
HEMATURIA: 0
CHOKING: 0
POLYDIPSIA: 0
VOMITING: 0
COLOR CHANGE: 0
PALPITATIONS: 0
DEPRESSION: 0
DIZZINESS: 0
APPETITE CHANGE: 0

## 2024-04-23 ASSESSMENT — COLUMBIA-SUICIDE SEVERITY RATING SCALE - C-SSRS
1. IN THE PAST MONTH, HAVE YOU WISHED YOU WERE DEAD OR WISHED YOU COULD GO TO SLEEP AND NOT WAKE UP?: NO
2. HAVE YOU ACTUALLY HAD ANY THOUGHTS OF KILLING YOURSELF?: NO
6. HAVE YOU EVER DONE ANYTHING, STARTED TO DO ANYTHING, OR PREPARED TO DO ANYTHING TO END YOUR LIFE?: NO

## 2024-04-23 ASSESSMENT — PAIN SCALES - GENERAL: PAINLEVEL: 2

## 2024-04-23 NOTE — PROGRESS NOTES
Subjective   Patient ID: Yasmine Ortiz is a 78 y.o. female with known history of hypertension hypothyroidism, GERD, IBS, dyslipidemia, anxiety and panic attacks who presents for Abdominal Pain (Onset:  approx. 1 month /Tx:  denies//Pt stated it started as a feeling of gassy/ gas bubble.  1 week ago, c/o pain ).    HPI the patient is presented with complaints of right sided abdominal discomfort which she developed a month ago. She feels like she has a bubble which moves from the side to the middle of the abdomen.  It comes and goes.  She takes MiraLAX daily for constipation.  Stated that she occasionally does not move her bowels well but then she is doing well. She denies pain. There is no nausea or vomiting.  Denies fever or chills.  She denies heartburn, bloating or belching.  No new medications.  She stays on the same diet.  Review of Systems   Constitutional:  Negative for appetite change, chills, fatigue and fever.   HENT:  Negative for congestion, ear pain, facial swelling, hearing loss, nosebleeds and sore throat.    Eyes:  Negative for pain, discharge and visual disturbance.   Respiratory:  Negative for cough, choking, chest tightness, shortness of breath and wheezing.    Cardiovascular:  Negative for chest pain, palpitations and leg swelling.   Gastrointestinal:  Positive for abdominal pain. Negative for abdominal distention, anal bleeding, constipation, diarrhea, nausea and vomiting.   Endocrine: Negative for cold intolerance, heat intolerance, polydipsia, polyphagia and polyuria.   Genitourinary:  Negative for difficulty urinating, frequency, hematuria and urgency.   Musculoskeletal:  Negative for arthralgias, gait problem, joint swelling and myalgias.   Skin:  Negative for color change and rash.   Neurological:  Negative for dizziness, tremors, syncope, weakness, numbness and headaches.   Psychiatric/Behavioral:  Negative for behavioral problems, confusion, sleep disturbance and suicidal ideas. The  "patient is not nervous/anxious.        Objective   /80   Temp 35.9 °C (96.6 °F)   Ht 1.549 m (5' 1\")   Wt 62.1 kg (137 lb)   Breastfeeding No   BMI 25.89 kg/m²     Physical Exam  Constitutional:       General: She is not in acute distress.     Appearance: Normal appearance.   HENT:      Head: Normocephalic and atraumatic.      Nose: Nose normal.   Eyes:      Extraocular Movements: Extraocular movements intact.      Conjunctiva/sclera: Conjunctivae normal.      Pupils: Pupils are equal, round, and reactive to light.   Cardiovascular:      Rate and Rhythm: Normal rate and regular rhythm.      Pulses: Normal pulses.      Heart sounds: Normal heart sounds.   Pulmonary:      Effort: Pulmonary effort is normal.      Breath sounds: Normal breath sounds.   Abdominal:      General: Bowel sounds are normal.      Palpations: Abdomen is soft.      Comments: Slightly tender to palpation over mid abdomen   Musculoskeletal:         General: Normal range of motion.      Cervical back: Normal range of motion and neck supple.   Neurological:      General: No focal deficit present.      Mental Status: She is alert and oriented to person, place, and time.   Psychiatric:         Mood and Affect: Mood normal.         Behavior: Behavior normal.         Thought Content: Thought content normal.         Judgment: Judgment normal.         Assessment/Plan     Abdominal discomfort  Will obtain KUB and ultrasound abdomen  Drink plenty of fluids  Continue MiraLAX daily  Fiber diet  I will call with the results    Last colonoscopy 2017 asymptomatic  Mammogram July 2022  Bone density 2021    Heart Palpitatuion  Metoprolol tartarated 25 mg daily  Doing well  No new complaints    Hypertension  No added salt diet, do not and salt to your food  Try to exercise every other day for 30 minutes  Continue current medications  Metoprolol 50 mg daily  25 mg in addition for palpitation when needed    Sinusitis  On pseudoephedrine  Since patient wa " 29 years of age  Tolerating no side effects  No palpitation     Hypothyroidism  Continue current medications  Report any changes such as weight gain or weight loss  Continue to exercise regularly  [facility] [place]    Follows with Dr Domonique Messina  On esterase     Continue with the low-fat, low-cholesterol diet,  I recommended Mediterranean diet, which include fish, chicken, vegetables and olive oil  Exercise daily for 30 minutes at least 3 times a week  Continue home medications    Gastroesophageal reflux disease  Continue current medications  Include spicy foods do not eat late at night  Do not wear tight fitting clothes  Exercise daily    Anxiety with panic  On as needed lorazepam    Follow-up with PCP as scheduled

## 2024-04-25 ENCOUNTER — HOSPITAL ENCOUNTER (OUTPATIENT)
Dept: RADIOLOGY | Facility: CLINIC | Age: 79
Discharge: HOME | End: 2024-04-25
Payer: MEDICARE

## 2024-04-25 DIAGNOSIS — R10.84 GENERALIZED ABDOMINAL PAIN: ICD-10-CM

## 2024-04-25 PROCEDURE — 76700 US EXAM ABDOM COMPLETE: CPT

## 2024-04-25 PROCEDURE — 76700 US EXAM ABDOM COMPLETE: CPT | Performed by: RADIOLOGY

## 2024-04-29 ENCOUNTER — TELEPHONE (OUTPATIENT)
Dept: PRIMARY CARE | Facility: CLINIC | Age: 79
End: 2024-04-29
Payer: MEDICARE

## 2024-05-01 ASSESSMENT — ENCOUNTER SYMPTOMS
EYE DISCHARGE: 0
MYALGIAS: 0
ANAL BLEEDING: 0
FEVER: 0
SORE THROAT: 0
ARTHRALGIAS: 0
FACIAL SWELLING: 0
DIFFICULTY URINATING: 0
CONSTIPATION: 0
FATIGUE: 0
COUGH: 0
NUMBNESS: 0
DIARRHEA: 0
JOINT SWELLING: 0
PALPITATIONS: 0
APPETITE CHANGE: 0
HEMATURIA: 0
NAUSEA: 0
DIZZINESS: 0
FREQUENCY: 0
EYE PAIN: 0
CHEST TIGHTNESS: 0
SHORTNESS OF BREATH: 0
WHEEZING: 0
ABDOMINAL PAIN: 1
POLYPHAGIA: 0
CHOKING: 0
HEADACHES: 0
NERVOUS/ANXIOUS: 0
CHILLS: 0
SLEEP DISTURBANCE: 0
ABDOMINAL DISTENTION: 0
CONFUSION: 0
TREMORS: 0
VOMITING: 0
POLYDIPSIA: 0
COLOR CHANGE: 0
WEAKNESS: 0

## 2024-05-02 ENCOUNTER — OFFICE VISIT (OUTPATIENT)
Dept: PRIMARY CARE | Facility: CLINIC | Age: 79
End: 2024-05-02
Payer: MEDICARE

## 2024-05-02 VITALS — BODY MASS INDEX: 25.86 KG/M2 | TEMPERATURE: 96.9 F | WEIGHT: 137 LBS | HEIGHT: 61 IN

## 2024-05-02 DIAGNOSIS — G89.29 CHRONIC BILATERAL LOW BACK PAIN WITH RIGHT-SIDED SCIATICA: Primary | ICD-10-CM

## 2024-05-02 DIAGNOSIS — K59.04 CHRONIC IDIOPATHIC CONSTIPATION: ICD-10-CM

## 2024-05-02 DIAGNOSIS — M54.41 CHRONIC BILATERAL LOW BACK PAIN WITH RIGHT-SIDED SCIATICA: Primary | ICD-10-CM

## 2024-05-02 PROCEDURE — 1036F TOBACCO NON-USER: CPT | Performed by: PHYSICIAN ASSISTANT

## 2024-05-02 PROCEDURE — 1159F MED LIST DOCD IN RCRD: CPT | Performed by: PHYSICIAN ASSISTANT

## 2024-05-02 PROCEDURE — 1157F ADVNC CARE PLAN IN RCRD: CPT | Performed by: PHYSICIAN ASSISTANT

## 2024-05-02 PROCEDURE — 1160F RVW MEDS BY RX/DR IN RCRD: CPT | Performed by: PHYSICIAN ASSISTANT

## 2024-05-02 PROCEDURE — 99214 OFFICE O/P EST MOD 30 MIN: CPT | Performed by: PHYSICIAN ASSISTANT

## 2024-05-02 PROCEDURE — 1125F AMNT PAIN NOTED PAIN PRSNT: CPT | Performed by: PHYSICIAN ASSISTANT

## 2024-05-02 RX ORDER — METHYLPREDNISOLONE 4 MG/1
TABLET ORAL
Qty: 21 TABLET | Refills: 0 | Status: SHIPPED | OUTPATIENT
Start: 2024-05-02 | End: 2024-05-07 | Stop reason: SDUPTHER

## 2024-05-02 ASSESSMENT — PAIN SCALES - GENERAL: PAINLEVEL: 6

## 2024-05-02 NOTE — PROGRESS NOTES
"Subjective   Patient ID: Yasmine Ortiz is a 78 y.o. female with known history of hypertension hypothyroidism, GERD, IBS, dyslipidemia, anxiety and panic attacks who presents for Bloated (Last seen 4/23/24.  Pt stated not doing better, feels worse./Tx:  Miralax, Dulcolax).    HPI the patient is presented for follow-up.  Her constipation had improved with MiraLAX and Dulcolax but she still continues having abdominal discomfort.  Stated that it feels like pins-and-needles and now radiates to her back.  It gets worse with prolonged standing.  She denies nausea or vomiting.    Review of Systems   Constitutional:  Negative for appetite change, chills, fatigue and fever.   HENT:  Negative for congestion, ear pain, facial swelling, hearing loss, nosebleeds and sore throat.    Eyes:  Negative for pain, discharge and visual disturbance.   Respiratory:  Negative for cough, choking, chest tightness, shortness of breath and wheezing.    Cardiovascular:  Negative for chest pain, palpitations and leg swelling.   Gastrointestinal:  Positive for abdominal pain. Negative for abdominal distention, anal bleeding, constipation, diarrhea, nausea and vomiting.   Endocrine: Negative for cold intolerance, heat intolerance, polydipsia, polyphagia and polyuria.   Genitourinary:  Negative for difficulty urinating, frequency, hematuria and urgency.   Musculoskeletal:  Negative for arthralgias, gait problem, joint swelling and myalgias.   Skin:  Negative for color change and rash.   Neurological:  Negative for dizziness, tremors, syncope, weakness, numbness and headaches.   Psychiatric/Behavioral:  Negative for behavioral problems, confusion, sleep disturbance and suicidal ideas. The patient is not nervous/anxious.        Objective   Temp 36.1 °C (96.9 °F)   Ht 1.549 m (5' 1\")   Wt 62.1 kg (137 lb)   Breastfeeding No   BMI 25.89 kg/m²     Physical Exam  Constitutional:       General: She is not in acute distress.     Appearance: Normal " appearance.   HENT:      Head: Normocephalic and atraumatic.      Nose: Nose normal.   Eyes:      Extraocular Movements: Extraocular movements intact.      Conjunctiva/sclera: Conjunctivae normal.      Pupils: Pupils are equal, round, and reactive to light.   Cardiovascular:      Rate and Rhythm: Normal rate and regular rhythm.      Pulses: Normal pulses.      Heart sounds: Normal heart sounds.   Pulmonary:      Effort: Pulmonary effort is normal.      Breath sounds: Normal breath sounds.   Abdominal:      General: Bowel sounds are normal.      Palpations: Abdomen is soft.   Musculoskeletal:         General: Normal range of motion.      Cervical back: Normal range of motion and neck supple.      Comments: Tenderness on palpation over right SI   Neurological:      General: No focal deficit present.      Mental Status: She is alert and oriented to person, place, and time.   Psychiatric:         Mood and Affect: Mood normal.         Behavior: Behavior normal.         Thought Content: Thought content normal.         Judgment: Judgment normal.         Assessment/Plan   Constipation  Improved with MiraLAX and Dulcolax  Stop Dulcolax  Continue MiraLAX  Continue drinking plenty of water  Continue fiber diet    Abdominal discomfort with pins-and-needles  Most likely due to sciatica  Start Medrol Dosepak as directed  Apply heating pads  Apply topical creams such as Voltaren or Biofreeze  Call if not better    Last colonoscopy 2017 asymptomatic  Mammogram July 2022  Bone density 2021    Heart Palpitatuion  Metoprolol tartarated 25 mg daily  Doing well  No new complaints    Hypertension  No added salt diet, do not and salt to your food  Try to exercise every other day for 30 minutes  Continue current medications  Metoprolol 50 mg daily  25 mg in addition for palpitation when needed    Sinusitis  On pseudoephedrine  Since patient wa 29 years of age  Tolerating no side effects  No palpitation     Hypothyroidism  Continue current  medications  Report any changes such as weight gain or weight loss  Continue to exercise regularly  Goes to [facility] in [place]    Follows with Dr Domonique Messina  On esterase     Continue with the low-fat, low-cholesterol diet,  I recommended Mediterranean diet, which include fish, chicken, vegetables and olive oil  Exercise daily for 30 minutes at least 3 times a week  Continue home medications    Gastroesophageal reflux disease  Continue current medications  Include spicy foods do not eat late at night  Do not wear tight fitting clothes  Exercise daily    Anxiety with panic  On as needed lorazepam    Follow-up with PCP as scheduled

## 2024-05-07 DIAGNOSIS — M54.41 CHRONIC BILATERAL LOW BACK PAIN WITH RIGHT-SIDED SCIATICA: ICD-10-CM

## 2024-05-07 DIAGNOSIS — G89.29 CHRONIC BILATERAL LOW BACK PAIN WITH RIGHT-SIDED SCIATICA: ICD-10-CM

## 2024-05-07 RX ORDER — PREDNISONE 20 MG/1
TABLET ORAL
Qty: 15 TABLET | Refills: 0 | Status: SHIPPED | OUTPATIENT
Start: 2024-05-07 | End: 2024-06-05 | Stop reason: WASHOUT

## 2024-05-07 RX ORDER — METHYLPREDNISOLONE 4 MG/1
TABLET ORAL
Qty: 21 TABLET | Refills: 0 | Status: SHIPPED | OUTPATIENT
Start: 2024-05-07 | End: 2024-05-07 | Stop reason: ALTCHOICE

## 2024-05-15 ENCOUNTER — HOSPITAL ENCOUNTER (OUTPATIENT)
Dept: RADIOLOGY | Facility: CLINIC | Age: 79
Discharge: HOME | End: 2024-05-15
Payer: MEDICARE

## 2024-05-15 DIAGNOSIS — G89.29 CHRONIC BILATERAL LOW BACK PAIN WITH RIGHT-SIDED SCIATICA: ICD-10-CM

## 2024-05-15 DIAGNOSIS — M54.41 CHRONIC BILATERAL LOW BACK PAIN WITH RIGHT-SIDED SCIATICA: ICD-10-CM

## 2024-05-15 PROCEDURE — 72131 CT LUMBAR SPINE W/O DYE: CPT

## 2024-05-15 PROCEDURE — 72131 CT LUMBAR SPINE W/O DYE: CPT | Performed by: RADIOLOGY

## 2024-05-16 DIAGNOSIS — M48.061 SPINAL STENOSIS OF LUMBAR REGION WITHOUT NEUROGENIC CLAUDICATION: Primary | ICD-10-CM

## 2024-05-22 ENCOUNTER — TELEMEDICINE (OUTPATIENT)
Dept: PRIMARY CARE | Facility: CLINIC | Age: 79
End: 2024-05-22
Payer: MEDICARE

## 2024-05-22 DIAGNOSIS — J20.9 ACUTE BRONCHITIS, UNSPECIFIED ORGANISM: Primary | ICD-10-CM

## 2024-05-22 PROCEDURE — 1160F RVW MEDS BY RX/DR IN RCRD: CPT | Performed by: INTERNAL MEDICINE

## 2024-05-22 PROCEDURE — 1159F MED LIST DOCD IN RCRD: CPT | Performed by: INTERNAL MEDICINE

## 2024-05-22 PROCEDURE — 99213 OFFICE O/P EST LOW 20 MIN: CPT | Performed by: INTERNAL MEDICINE

## 2024-05-22 PROCEDURE — 1157F ADVNC CARE PLAN IN RCRD: CPT | Performed by: INTERNAL MEDICINE

## 2024-05-22 RX ORDER — DOXYCYCLINE 100 MG/1
100 CAPSULE ORAL 2 TIMES DAILY
Qty: 20 CAPSULE | Refills: 0 | Status: SHIPPED | OUTPATIENT
Start: 2024-05-22 | End: 2024-06-10

## 2024-05-22 NOTE — PROGRESS NOTES
Subjective   Yasmine Ortiz is a 78 y.o. female who presents for virtual visit complaining of shortness of breath and a cough.  HPI  Yasmine is a 78-year-old female with known history of hypertension hypothyroidism, dyslipidemia patient takes medication prescribed.  Patient complaining of cough dry nonproductive for the last few days she denies fever chills diaphoresis no dizziness she is taking Sudafed with some improvement.       Review of Systems  10 system review pertinent as above  Objective   Virtual visit  There were no vitals taken for this visit.     Physical Exam  Virtual visit    Assessment/Plan     Acute bronchitis  Cough dry  Without fever or chills at this point  I suspect bacterial in nature  Start doxycycline 100 mg twice a day  Fluids and rest  If not better    Last colonoscopy 2017 asymptomatic  Mammogram July 2022  Bone density 2021    Heart Palpitatuion  Metoprolol tartarated 25 mg daily  Doing well  No new complaints    Hypertension  No added salt diet, do not and salt to your food  Try to exercise every other day for 30 minutes  Continue current medications  Metoprolol 50 mg daily  25 mg in addition for palpitation when needed    Sinusitis  On pseudoephedrine  Since patient wa 29 years of age  Tolerating no side effects  No palpitation     Hypothyroidism  Continue current medications  Report any changes such as weight gain or weight loss  Continue to exercise regularly  Goes [facility] in [place]    Follows with Dr Domonique Messina  On esterase     Continue with the low-fat, low-cholesterol diet,  I recommended Mediterranean diet, which include fish, chicken, vegetables and olive oil  Exercise daily for 30 minutes at least 3 times a week  Continue home medications    Gastroesophageal reflux disease  Continue current medications  Include spicy foods do not eat late at night  Do not wear tight fitting clothes  Exercise daily    Anxiety with panic  On as needed lorazepam  Last prescription  February 24, 2023  For 30 pills    6 mm Pul module non smoker  CT Chest repeat March 2024 repeat     OARRS:  No data recorded  I have personally reviewed the OARRS report for Yasmine Ortiz. I have considered the risks of abuse, dependence, addiction and diversion and I believe that it is clinically appropriate for Yasmine Ortiz to be prescribed this medication    Is the patient prescribed a combination of a benzodiazepine and opioid?  No    Last Urine Drug Screen / ordered today: Yes  Recent Results (from the past 8760 hour(s))   Drug Screen, Urine With Reflex to Confirmation    Collection Time: 08/04/23 10:42 AM   Result Value Ref Range    DRUG SCREEN COMMENT URINE SEE BELOW     Amphetamine Screen, Urine PRESUMPTIVE NEGATIVE NEGATIVE    Barbiturate Screen, Urine PRESUMPTIVE NEGATIVE NEGATIVE    BENZODIAZEPINE (PRESENCE) IN URINE BY SCREEN METHOD PRESUMPTIVE NEGATIVE NEGATIVE    Cannabinoid Screen, Urine PRESUMPTIVE NEGATIVE NEGATIVE    Cocaine Screen, Urine PRESUMPTIVE NEGATIVE NEGATIVE    Fentanyl, Ur PRESUMPTIVE NEGATIVE NEGATIVE    Methadone Screen, Urine PRESUMPTIVE NEGATIVE NEGATIVE    Opiate Screen, Urine PRESUMPTIVE NEGATIVE NEGATIVE    Oxycodone Screen, Ur PRESUMPTIVE NEGATIVE NEGATIVE    PCP Screen, Urine PRESUMPTIVE NEGATIVE NEGATIVE     Results are as expected.     Controlled Substance Agreement:  Date of the Last Agreement: August 4, 2023  Reviewed Controlled Substance Agreement including but not limited to the benefits, risks, and alternatives to treatment with a Controlled Substance medication(s).    Benzodiazepines:  What is the patient's goal of therapy?  Anxiety panic  Is this being achieved with current treatment?  Yes    SANDRA-7:  No data recorded    Activities of Daily Living:   Is your overall impression that this patient is benefiting (symptom reduction outweighs side effects) from benzodiazepine therapy? Yes     1. Physical Functioning: Better  2. Family Relationship: Better  3. Social  Relationship: Better  4. Mood: Better  5. Sleep Patterns: Better  6. Overall Function: Better  Problem List Items Addressed This Visit    None  Visit Diagnoses       Acute bronchitis, unspecified organism    -  Primary    Relevant Medications    doxycycline (Monodox) 100 mg capsule        Brenden Milner MD

## 2024-05-29 ENCOUNTER — TELEPHONE (OUTPATIENT)
Dept: PRIMARY CARE | Facility: CLINIC | Age: 79
End: 2024-05-29
Payer: MEDICARE

## 2024-05-29 NOTE — TELEPHONE ENCOUNTER
Pt is calling wondering if she needs the blood work done at an outside lab before her appt on June 5th or if she is supposed to get it done during the appt this time.

## 2024-05-31 NOTE — PROGRESS NOTES
Subjective   Yasmine Ortiz is a 78 y.o. female who presents for a follow-up fasting blood work.  ADOLFO Underwood is a 78-year-old female with known history of hypertension hypothyroidism, dyslipidemia patient takes medication prescribed.  Recently treated for upper respiratory infection and cough, here for follow-up,    for fasting blood test. Patient is active exercises     Review of Systems  10 system review pertinent as above  Objective   Virtual visit  There were no vitals taken for this visit.     Physical Exam  Virtual visit    Assessment/Plan     Herniated L4-5 disc material  Spinal surgery  Has an appointment with dr Gilmore   Pt for eval    Splenic artery aneurysm  Vascular surgery    Post nasal drip  Seasonal allergies  Add azelastine  If does not work will add sigulair     Last colonoscopy 2017 asymptomatic  Mammogram July 2022  Bone density 2021    Heart Palpitatuion  Metoprolol tartarated 25 mg daily  Doing well  No new complaints    Hypertension  No added salt diet, do not and salt to your food  Try to exercise every other day for 30 minutes  Continue current medications  Metoprolol 50 mg daily  25 mg in addition for palpitation when needed    Sinusitis  On pseudoephedrine  Since patient wa 29 years of age  Tolerating no side effects  No palpitation     Hypothyroidism  Continue current medications  Report any changes such as weight gain or weight loss  Continue to exercise regularly  Goes to [facility] in [place]    Follows with Dr Domonique Messina  On esterase     Continue with the low-fat, low-cholesterol diet,  I recommended Mediterranean diet, which include fish, chicken, vegetables and olive oil  Exercise daily for 30 minutes at least 3 times a week  Continue home medications    Gastroesophageal reflux disease  Continue current medications  Include spicy foods do not eat late at night  Do not wear tight fitting clothes  Exercise daily    Anxiety with panic  On as needed lorazepam  Last prescription  February 24, 2023  For 30 pills    6 mm Pul module non smoker  CT Chest repeat March 2024 repeat     OARRS:  No data recorded  I have personally reviewed the OARRS report for Yasmine Ortiz. I have considered the risks of abuse, dependence, addiction and diversion and I believe that it is clinically appropriate for Yasmine Ortiz to be prescribed this medication    Is the patient prescribed a combination of a benzodiazepine and opioid?  No    Last Urine Drug Screen / ordered today: Yes  Recent Results (from the past 8760 hour(s))   Drug Screen, Urine With Reflex to Confirmation    Collection Time: 08/04/23 10:42 AM   Result Value Ref Range    DRUG SCREEN COMMENT URINE SEE BELOW     Amphetamine Screen, Urine PRESUMPTIVE NEGATIVE NEGATIVE    Barbiturate Screen, Urine PRESUMPTIVE NEGATIVE NEGATIVE    BENZODIAZEPINE (PRESENCE) IN URINE BY SCREEN METHOD PRESUMPTIVE NEGATIVE NEGATIVE    Cannabinoid Screen, Urine PRESUMPTIVE NEGATIVE NEGATIVE    Cocaine Screen, Urine PRESUMPTIVE NEGATIVE NEGATIVE    Fentanyl, Ur PRESUMPTIVE NEGATIVE NEGATIVE    Methadone Screen, Urine PRESUMPTIVE NEGATIVE NEGATIVE    Opiate Screen, Urine PRESUMPTIVE NEGATIVE NEGATIVE    Oxycodone Screen, Ur PRESUMPTIVE NEGATIVE NEGATIVE    PCP Screen, Urine PRESUMPTIVE NEGATIVE NEGATIVE     Results are as expected.     Controlled Substance Agreement:  Date of the Last Agreement: August 4, 2023  Reviewed Controlled Substance Agreement including but not limited to the benefits, risks, and alternatives to treatment with a Controlled Substance medication(s).    Benzodiazepines:  What is the patient's goal of therapy?  Anxiety panic  Is this being achieved with current treatment?  Yes    SANDRA-7:  No data recorded    Activities of Daily Living:   Is your overall impression that this patient is benefiting (symptom reduction outweighs side effects) from benzodiazepine therapy? Yes     1. Physical Functioning: Better  2. Family Relationship: Better  3. Social  Relationship: Better  4. Mood: Better  5. Sleep Patterns: Better  6. Overall Function: Better  Problem List Items Addressed This Visit    None    Brenden Milner MD

## 2024-06-04 NOTE — PROGRESS NOTES
Physical Therapy  Physical Therapy Orthopedic Evaluation    Patient Name: Yasmine Ortiz  MRN: [id number]  Today's Date: 6/5/2024  Time Calculation  Start Time: 1115  Stop Time: 1200  Time Calculation (min): 45 min  PT Evaluation Time Entry  PT Evaluation (Moderate) Time Entry: 30 PT Therapeutic Procedures Time Entry  Therapeutic Exercise Time Entry: 15          Insurance:  Visit number: 1 of mn  Authorization info: no auth  Insurance Type: Payor: [employer] MEDICARE / Plan: [employer] MEDICARE ASSURE / Product Type: *No Product type* /    MCR cert: 6/5/24---8/14/24    Current Problem  1. Spinal stenosis of lumbar region without neurogenic claudication  Referral to Physical Therapy    Follow Up In Physical Therapy        General:  General  Reason for Referral: low back pain  Referred By: Elizabeth Grigsby PA-C  Onset date:  5/5/24    Precautions:     KATJA Degroot  Medical History Form: Reviewed (scanned into chart)    Subjective   MAX: about 18mos ago she was doing a plank on the wall and has had R thoracolumbar muscular pain.  About a month ago she started getting pins and needles from around her bellybutton to her back.  She had a CT scan, shows a lot of arthritis L2-S1 and mild disc bulging L1-2.  A splenic artery aneurysm was also seen and she will consult with vascular surgery in regards to this.  She was getting seen for PT earlier this year for the shoulder and has stopped d.t. the back pain.  She notes doing her shoulder PT is one of the factors that increases her pain.  She reports tingling in the L2 nerve distribution and pain at her L2. She took a prednisone pack that helped some, but pain came back as soon as it wore off.  She notes her sleep is affected, she can only sleep directly on the L side.   Chief Complaint: back pain and tingling  Onset: 5/5/24    Pain:     Location: L2 nerve distribution  Description: tingling, achy  Aggravating Factors:  shoulder PT, walking up hill, line dancing, reaching OH, LTR  stretch.  Relieving Factors:  avoidance, sitting, advil  Current: 3-4/10  Best: 0/10  Worse 5/10  Current Condition:   Same    Social Support: lives alone; has close friends in neighborhood.   Patients Living Environment: Reviewed and no concern    Functional limitations: walking with her friends outside; line dancing, shoulder PT, gardening (forward bending), sleeping  Exercise/Recreational Activities: line dancing, walking outside in her neighborhood [place]  Work/School: retired  Patient's Goal(s) for Therapy: return to her prior activities, slep    Relevant Information (PMH & Previous Tests/Imaging): Intervertebral Disc Spaces:   CT: 5/14/24  Severe degenerative discogenic change at  L4-L5 with severe disc height loss, endplate sclerosis, and  subchondral cystic change. Additional mild multilevel degenerative  disc height loss with vacuum disc components at L3-L4.          Degenerative change:      T12-L1: Mild facet arthropathy. There is no significant spinal canal  stenosis or neural foraminal narrowing.      L1-2:  Mild facet arthropathy and ligamentum flavum hypertrophy. Mild  disc bulge/uncovering. No significant spinal canal stenosis. Mild  bilateral neural foraminal narrowing suspected.      L2-3:  Moderate to advanced facet arthropathy with ligamentum flavum  hypertrophy. Prominent dorsal epidural fat. Disc bulge/uncovering.  There is mild spinal canal stenosis suggested. Mild bilateral neural  foraminal narrowing.      L3-4:  Moderate facet arthropathy. Ligamentum flavum hypertrophy.  Prominent dorsal epidural fat. Disc uncovering/bulge. Lateral recess  narrowing with mild spinal canal stenosis suggested. Moderate  bilateral neural foraminal narrowing.      L4-5:  Severe facet arthropathy. Ligamentum flavum hypertrophy. Disc  uncovering and likely central disc extrusion components with cranial  migration. Severe spinal canal stenosis. Severe bilateral neural  foraminal narrowing.      L5-S1:   Moderate left-greater-than-right facet arthropathy. Mild disc  bulge and endplate spurring. No CT apparent spinal canal stenosis.  Mild-to-moderate right and no significant left neural foraminal  narrowing.      Prevertebral/Paraspinal Soft Tissues: Questionable peripherally  calcified 1.4 cm splenic artery aneurysm (series 5, image 63). Severe  atherosclerotic calcification of the infrarenal abdominal aorta with  normal caliber. Suspected parapelvic left renal cyst, incompletely  characterized. Paraspinal soft tissues are otherwise within normal  limits.  Previous Interventions/Treatments: None    Primary Language: English      Red Flags: Do you have any of the following? No  Fever/chills, unexplained weight changes, dizziness/fainting, unexplained change in bowel or bladder functions, unexplained malaise or muscle weakness, night pain/sweats, numbness or tingling    Objective:    Posture: decreased lordosis, L iliac crest elevated.    Transfers:  independent     Gait: mildly antalgic, mild scissoring R     Lumbar ROM:  Flexion WNL; no increased pain, hands to floor  Extension % limitations, avoids extending from the R, has R pain with extension  SG 25% limitation B, no increased pain.    LE MMT    R/L    hip flexion 4/4+  Ext     3+/ 4      IR 4/4+      ER 5/5  knee ext 5/5     flex 4/5  ankle DF 5/5     PF 5/5    INV 4/4    EV 5/5  Toe ext  3+/3+    Slump -B  SLR:  110 B    repeated movements:     RFIS: pain: 5/10 lateral paraspinals L2-S1 with tingling; x10: after movement: 5/10: no tingling, L4/5 areas    Lumbar ROM:  Flexion WNL; no increased pain, hands to floor  Extension % limitations, has R pain with extension  SG 0% limitation B, no increased pain.    LE MMT    R/L    hip flexion 4+/4+  Ext     3+/ 4      IR 4/4+      ER 5/5  knee ext 5/5     flex 4+/5  ankle DF 5/5     PF 5/5    INV 4+/5    EV 5/5  Toe ext  4/3+    Attempted CATALINO: pain increased    Outcome Measure:   Other Measures  Oswestry  Disablity Index (JULIANN): 32%     Treatment:  Pt educated in repeated flexion, with focus on rounding the back vs flexing from her hips/hamstrings.  Pt educated in s/s of centralization vs peripherlization and to email her therapist if she peripheralizes.  She was advised to avoid extension is possible and to sandwich extension with flexion if she needs to.     EDUCATION:   Outpatient Education  Individual(s) Educated: Patient  Education Provided: Anatomy, Body Mechanics, Home Exercise Program, POC (Centralization vs peripheralization)  Patient/Caregiver Demonstrated Understanding: yes  Plan of Care Discussed and Agreed Upon: yes  Patient Response to Education: Patient/Caregiver Verbalized Understanding of Information    Assessment:   77yo female presents today with acute onset of R upper lumbar radicular pain of insidious onset.  It peripheralizes to her umbilicus.  She presents with decreased ROM, myotomal weakness, +flexion bias.  She c/o decreased tolerance for standing, reaching OH d.t. lumbar extension, gardening, lifting, sleeping, and walking with her neighbors.  Patient presents with signs and symptoms consistent with R lumbar radiculopathy, resulting in limited participation in pain-free ADLs and inability to perform at their prior level of function. Pt would benefit from physical therapy to address the impairments found & listed previously in the objective section in order to return to safe and pain-free ADLs and prior level of function.    PT Assessment Results: Decreased strength, Decreased range of motion, Pain  Rehab Prognosis: Good    Plan:  Treatment/Interventions: Aquatic therapy, Dry needling, Education/ Instruction, Electrical stimulation, Hot pack, Manual therapy, Mechanical traction, Neuromuscular re-education, Self care/ home management, Therapeutic exercises  PT Plan: Skilled PT  PT Frequency:  (1x/wk for 6-10 wks)  Rehab Potential: Good  Plan of Care Agreement: Patient    Goals: Set and  discussed today    1. Pt will be independent with ADLs reaching to top shelf in kitchen and sleeping without interruption from pain  2. Pt will be independent with HEP  3. Pt will improve ROM of lumbar extension by at least 25%  4. Pt will improve strength of LE to B 5/5  5.  Pt will report at least 16% improvement on JULIANN  6. Pt will report returning to gardening and walking with her neighbors with 0-2/10 pain    Plan of care was developed with input and agreement by the patient      Dania Ku, PT

## 2024-06-05 ENCOUNTER — EVALUATION (OUTPATIENT)
Dept: PHYSICAL THERAPY | Facility: CLINIC | Age: 79
End: 2024-06-05
Payer: MEDICARE

## 2024-06-05 ENCOUNTER — OFFICE VISIT (OUTPATIENT)
Dept: PRIMARY CARE | Facility: CLINIC | Age: 79
End: 2024-06-05
Payer: MEDICARE

## 2024-06-05 VITALS — BODY MASS INDEX: 25.3 KG/M2 | HEIGHT: 61 IN | WEIGHT: 134 LBS

## 2024-06-05 DIAGNOSIS — M48.061 SPINAL STENOSIS OF LUMBAR REGION WITHOUT NEUROGENIC CLAUDICATION: ICD-10-CM

## 2024-06-05 DIAGNOSIS — I72.8 SPLENIC ARTERY ANEURYSM (CMS-HCC): Primary | ICD-10-CM

## 2024-06-05 DIAGNOSIS — E78.5 DYSLIPIDEMIA: ICD-10-CM

## 2024-06-05 DIAGNOSIS — J30.2 SEASONAL ALLERGIES: ICD-10-CM

## 2024-06-05 DIAGNOSIS — E03.9 HYPOTHYROIDISM, UNSPECIFIED TYPE: ICD-10-CM

## 2024-06-05 DIAGNOSIS — I10 PRIMARY HYPERTENSION: ICD-10-CM

## 2024-06-05 PROCEDURE — 80048 BASIC METABOLIC PNL TOTAL CA: CPT | Performed by: INTERNAL MEDICINE

## 2024-06-05 PROCEDURE — 1157F ADVNC CARE PLAN IN RCRD: CPT | Performed by: INTERNAL MEDICINE

## 2024-06-05 PROCEDURE — 80061 LIPID PANEL: CPT | Performed by: INTERNAL MEDICINE

## 2024-06-05 PROCEDURE — 84450 TRANSFERASE (AST) (SGOT): CPT | Performed by: INTERNAL MEDICINE

## 2024-06-05 PROCEDURE — 99214 OFFICE O/P EST MOD 30 MIN: CPT | Performed by: INTERNAL MEDICINE

## 2024-06-05 PROCEDURE — 1036F TOBACCO NON-USER: CPT | Performed by: INTERNAL MEDICINE

## 2024-06-05 PROCEDURE — 84460 ALANINE AMINO (ALT) (SGPT): CPT | Performed by: INTERNAL MEDICINE

## 2024-06-05 PROCEDURE — 84443 ASSAY THYROID STIM HORMONE: CPT | Performed by: INTERNAL MEDICINE

## 2024-06-05 PROCEDURE — 97162 PT EVAL MOD COMPLEX 30 MIN: CPT | Mod: GP

## 2024-06-05 PROCEDURE — 97110 THERAPEUTIC EXERCISES: CPT | Mod: GP

## 2024-06-05 RX ORDER — AZELASTINE 1 MG/ML
1 SPRAY, METERED NASAL 2 TIMES DAILY
Qty: 30 ML | Refills: 12 | Status: SHIPPED | OUTPATIENT
Start: 2024-06-05 | End: 2025-06-05

## 2024-06-05 ASSESSMENT — PATIENT HEALTH QUESTIONNAIRE - PHQ9
1. LITTLE INTEREST OR PLEASURE IN DOING THINGS: NOT AT ALL
2. FEELING DOWN, DEPRESSED OR HOPELESS: NOT AT ALL
SUM OF ALL RESPONSES TO PHQ9 QUESTIONS 1 AND 2: 0

## 2024-06-05 ASSESSMENT — ENCOUNTER SYMPTOMS
DEPRESSION: 0
LOSS OF SENSATION IN FEET: 0
LOSS OF SENSATION IN FEET: 0
DEPRESSION: 0
OCCASIONAL FEELINGS OF UNSTEADINESS: 1
OCCASIONAL FEELINGS OF UNSTEADINESS: 0

## 2024-06-06 NOTE — PROGRESS NOTES
Subjective   Patient ID: Yasmine Ortiz is a 78 y.o. female who presents for Bladder Prolapse.    PAP 10-14-10 NEG, hysterectomy 1981  MAMMO 7-20-23  DEXA 7-19-23  COLON 5-2017 negative, good for 10 years. May not get another one.       Has herniated back disc. Will start PT and see a spine doctor. Saw splenic aneurism.    Living by self. Has lung nodules being followed . Fainted a couple years ago. Hit vocal cords. Daughter 52 has some kind of cancer, elevated marker, still in work up.         Estradiol cream helps with dryness. Recurrent UTIs. Had maybe 2 UTIs in past year.     Prolapse does not bother patient. About the same. Cystocele.         Review of Systems   All other systems reviewed and are negative.      Objective   Constitutional: Alert and in no acute distress. Well developed, well nourished.  Neck: no neck asymmetry. Supple and thyroid not enlarged and there were no palpable thyroid nodules.  Chest: Breasts normal appearance, no nipple discharge and no skin changes and palpation of breasts and axillae: no palpable mass and no axillary lymphadenopathy.  Abdomen: soft nontender; no abdominal mass palpated, no organomegaly and no hernias.  Genitourinary: external genitalia: normal, no inguinal lymphadenopathy, Bartholin's urethral and [place]'s glands: normal, urethra: normal and bladder: normal on palpation.  Vagina: small cystocele at introitus.  Cervix: normal.  Uterus: normal.   Right adnexa/parametria: normal.  Left adnexa/parametria: normal.  Skin: normal skin color and pigmentation, normal skin turgor and no rash.  Psychiatric: alert and oriented x 3, affect normal to patient baseline and mood appropriate.     Assessment/Plan   -has [facility] order.  -refill Estradiol cream. Continue twice weekly.  -Will not refer to urogyn at this time as the prolapse doesn't bother her and she has other issues to address.

## 2024-06-10 ENCOUNTER — OFFICE VISIT (OUTPATIENT)
Dept: OBSTETRICS AND GYNECOLOGY | Facility: CLINIC | Age: 79
End: 2024-06-10
Payer: MEDICARE

## 2024-06-10 VITALS
SYSTOLIC BLOOD PRESSURE: 130 MMHG | HEIGHT: 61 IN | DIASTOLIC BLOOD PRESSURE: 98 MMHG | BODY MASS INDEX: 25.98 KG/M2 | WEIGHT: 137.6 LBS

## 2024-06-10 DIAGNOSIS — N89.8 VAGINAL DRYNESS: Primary | ICD-10-CM

## 2024-06-10 DIAGNOSIS — Z01.419 WELL WOMAN EXAM WITH ROUTINE GYNECOLOGICAL EXAM: ICD-10-CM

## 2024-06-10 PROCEDURE — 1160F RVW MEDS BY RX/DR IN RCRD: CPT | Performed by: OBSTETRICS & GYNECOLOGY

## 2024-06-10 PROCEDURE — 3080F DIAST BP >= 90 MM HG: CPT | Performed by: OBSTETRICS & GYNECOLOGY

## 2024-06-10 PROCEDURE — 1159F MED LIST DOCD IN RCRD: CPT | Performed by: OBSTETRICS & GYNECOLOGY

## 2024-06-10 PROCEDURE — 99213 OFFICE O/P EST LOW 20 MIN: CPT | Performed by: OBSTETRICS & GYNECOLOGY

## 2024-06-10 PROCEDURE — 1157F ADVNC CARE PLAN IN RCRD: CPT | Performed by: OBSTETRICS & GYNECOLOGY

## 2024-06-10 PROCEDURE — 3075F SYST BP GE 130 - 139MM HG: CPT | Performed by: OBSTETRICS & GYNECOLOGY

## 2024-06-10 PROCEDURE — 1036F TOBACCO NON-USER: CPT | Performed by: OBSTETRICS & GYNECOLOGY

## 2024-06-10 RX ORDER — ESTRADIOL 0.1 MG/G
0.5 CREAM VAGINAL 2 TIMES WEEKLY
Qty: 42.5 G | Refills: 3 | Status: SHIPPED | OUTPATIENT
Start: 2024-06-10

## 2024-06-20 ENCOUNTER — TREATMENT (OUTPATIENT)
Dept: PHYSICAL THERAPY | Facility: CLINIC | Age: 79
End: 2024-06-20
Payer: MEDICARE

## 2024-06-20 DIAGNOSIS — M48.061 SPINAL STENOSIS OF LUMBAR REGION WITHOUT NEUROGENIC CLAUDICATION: Primary | ICD-10-CM

## 2024-06-20 DIAGNOSIS — M25.511 RIGHT SHOULDER PAIN, UNSPECIFIED CHRONICITY: ICD-10-CM

## 2024-06-20 PROCEDURE — 97140 MANUAL THERAPY 1/> REGIONS: CPT | Mod: GP

## 2024-06-20 PROCEDURE — 97110 THERAPEUTIC EXERCISES: CPT | Mod: GP

## 2024-06-20 NOTE — PROGRESS NOTES
Physical Therapy  Physical Therapy Treatment    Patient Name: Yasmine Ortiz  MRN: [id number]  Today's Date: 6/24/2024  Time Calculation  Start Time: 1125  Stop Time: 1205  Time Calculation (min): 40 min    PT Therapeutic Procedures Time Entry  Manual Therapy Time Entry: 20 PT Modalities Time Entry  E-Stim (Unattended) Time Entry: 15        Insurance:  Visit number: 3 of mn  Authorization info: no auth  Insurance Type: Payor: T MEDICARE / Plan: [employer] MEDICARE ASSURE / Product Type: *No Product type* /    MCR cert: 6/5/24---8/14/24    Current Problem  1. Spinal stenosis of lumbar region without neurogenic claudication  Follow Up In Physical Therapy        General:     Onset date:  5/5/24    Precautions:   CT: 5/14/24  Severe degenerative discogenic change at  L4-L5 with severe disc height loss, endplate sclerosis, and  subchondral cystic change. Additional mild multilevel degenerative  disc height loss with vacuum disc components at L3-L4.          Degenerative change:      T12-L1: Mild facet arthropathy. There is no significant spinal canal  stenosis or neural foraminal narrowing.      L1-2:  Mild facet arthropathy and ligamentum flavum hypertrophy. Mild  disc bulge/uncovering. No significant spinal canal stenosis. Mild  bilateral neural foraminal narrowing suspected.      L2-3:  Moderate to advanced facet arthropathy with ligamentum flavum  hypertrophy. Prominent dorsal epidural fat. Disc bulge/uncovering.  There is mild spinal canal stenosis suggested. Mild bilateral neural  foraminal narrowing.      L3-4:  Moderate facet arthropathy. Ligamentum flavum hypertrophy.  Prominent dorsal epidural fat. Disc uncovering/bulge. Lateral recess  narrowing with mild spinal canal stenosis suggested. Moderate  bilateral neural foraminal narrowing.      L4-5:  Severe facet arthropathy. Ligamentum flavum hypertrophy. Disc  uncovering and likely central disc extrusion components with cranial  migration. Severe spinal canal  stenosis. Severe bilateral neural  foraminal narrowing.      L5-S1:  Moderate left-greater-than-right facet arthropathy. Mild disc  bulge and endplate spurring. No CT apparent spinal canal stenosis.  Mild-to-moderate right and no significant left neural foraminal  narrowing.      Prevertebral/Paraspinal Soft Tissues: Questionable peripherally  calcified 1.4 cm splenic artery aneurysm (series 5, image 63). Severe  atherosclerotic calcification of the infrarenal abdominal aorta with  normal caliber. Suspected parapelvic left renal cyst, incompletely  characterized. Paraspinal soft tissues are otherwise within normal  limits.  STEADI - 1  Osteopenia   Medical History Form: Reviewed (scanned into chart)    Subjective   Overall she feels the same as last week.  She saw Dr. Gilmore this morning and he thinks she will benefit from TENS, massage, traction and core strengthening.  Whatever is going on has been present for a while and it is putting pressure on a nerve causing the tingling wrapping around her abdomen.  She is confused with one of her HEP exercises.      Pain:   Current: 3-4/10  Best: 0/10  Worse 5/10    Objective:  6/20:  Thoracic PA assessment: mid to upper thoracic mobility very limited.  Palpation:  TTP to the R rhomboid/MT, thoracolumbar paraspinals and QL area.    IE:  Posture: decreased lordosis, L iliac crest elevated.    Transfers:  independent     Gait: mildly antalgic, mild scissoring R     Lumbar ROM:  Flexion WNL; no increased pain, hands to floor  Extension % limitations, avoids extending from the R, has R pain with extension  SG 25% limitation B, no increased pain.    LE MMT    R/L    hip flexion 4/4+  Ext     3+/ 4      IR 4/4+      ER 5/5  knee ext 5/5     flex 4/5  ankle DF 5/5     PF 5/5    INV 4/4    EV 5/5  Toe ext  3+/3+    Slump -B  SLR:  110 B    repeated movements:     RFIS: pain: 5/10 lateral paraspinals L2-S1 with tingling; x10: after movement: 5/10: no tingling, L4/5  areas    Lumbar ROM:  Flexion WNL; no increased pain, hands to floor  Extension % limitations, has R pain with extension  SG 0% limitation B, no increased pain.    LE MMT    R/L    hip flexion 4+/4+  Ext     3+/ 4      IR 4/4+      ER 5/5  knee ext 5/5     flex 4+/5  ankle DF 5/5     PF 5/5    INV 4+/5    EV 5/5  Toe ext  4/3+    Attempted CATALINO: pain increased    Outcome Measure:     Other Measures  Oswestry Disablity Index (JULIANN): 32%     Treatment:  Scifit ran L2x6'  Attempted thermaprobe but stopped d.t. poor conductivity    Manual: 20'  DTM to B lumbar and lower thoracic paraspinals  Prone thoracic PA mobs    ESTIM: 15'  TENS x15' with MHP     EDUCATION:    Access Code: [email or web address]    Assessment:   Alexandra tx well.  She is very stiff in her thoracic PA mobility.  She was tender with manual work to upper lumbar/lower thoracic paraspinals.  TENS was comfortable.       PT Assessment Results: Decreased strength, Decreased range of motion, Pain     Plan:  Assess response to thoracic work today and TENS.  C.w. manual as needed; scapular and core strengthening.  May benefit from traction.     Treatment/Interventions: Aquatic therapy, Dry needling, Education/ Instruction, Electrical stimulation, Hot pack, Manual therapy, Mechanical traction, Neuromuscular re-education, Self care/ home management, Therapeutic exercises     Goals: Set and discussed today    1. Pt will be independent with ADLs reaching to top shelf in kitchen and sleeping without interruption from pain  2. Pt will be independent with HEP  3. Pt will improve ROM of lumbar extension by at least 25%  4. Pt will improve strength of LE to B 5/5  5.  Pt will report at least 16% improvement on JULIANN  6. Pt will report returning to gardening and walking with her neighbors with 0-2/10 pain    Plan of care was developed with input and agreement by the patient      Dania Ku, PT

## 2024-06-20 NOTE — PROGRESS NOTES
Physical Therapy  Physical Therapy Treatment    Patient Name: Yasmine Ortiz  MRN: [id number]  Today's Date: 6/20/2024  Time Calculation  Start Time: 0830  Stop Time: 0915  Time Calculation (min): 45 min    PT Therapeutic Procedures Time Entry  Manual Therapy Time Entry: 20  Therapeutic Exercise Time Entry: 25          Insurance:  Visit number: 2 of mn  Authorization info: no auth  Insurance Type: Payor: T MEDICARE / Plan: [employer] MEDICARE ASSURE / Product Type: *No Product type* /    MCR cert: 6/5/24---8/14/24    Current Problem  1. Spinal stenosis of lumbar region without neurogenic claudication        2. Right shoulder pain, unspecified chronicity  Follow Up In Physical Therapy        General:     Onset date:  5/5/24    Precautions:   CT: 5/14/24  Severe degenerative discogenic change at  L4-L5 with severe disc height loss, endplate sclerosis, and  subchondral cystic change. Additional mild multilevel degenerative  disc height loss with vacuum disc components at L3-L4.          Degenerative change:      T12-L1: Mild facet arthropathy. There is no significant spinal canal  stenosis or neural foraminal narrowing.      L1-2:  Mild facet arthropathy and ligamentum flavum hypertrophy. Mild  disc bulge/uncovering. No significant spinal canal stenosis. Mild  bilateral neural foraminal narrowing suspected.      L2-3:  Moderate to advanced facet arthropathy with ligamentum flavum  hypertrophy. Prominent dorsal epidural fat. Disc bulge/uncovering.  There is mild spinal canal stenosis suggested. Mild bilateral neural  foraminal narrowing.      L3-4:  Moderate facet arthropathy. Ligamentum flavum hypertrophy.  Prominent dorsal epidural fat. Disc uncovering/bulge. Lateral recess  narrowing with mild spinal canal stenosis suggested. Moderate  bilateral neural foraminal narrowing.      L4-5:  Severe facet arthropathy. Ligamentum flavum hypertrophy. Disc  uncovering and likely central disc extrusion components with  cranial  migration. Severe spinal canal stenosis. Severe bilateral neural  foraminal narrowing.      L5-S1:  Moderate left-greater-than-right facet arthropathy. Mild disc  bulge and endplate spurring. No CT apparent spinal canal stenosis.  Mild-to-moderate right and no significant left neural foraminal  narrowing.      Prevertebral/Paraspinal Soft Tissues: Questionable peripherally  calcified 1.4 cm splenic artery aneurysm (series 5, image 63). Severe  atherosclerotic calcification of the infrarenal abdominal aorta with  normal caliber. Suspected parapelvic left renal cyst, incompletely  characterized. Paraspinal soft tissues are otherwise within normal  limits.  STEADI - 1  Osteopenia   Medical History Form: Reviewed (scanned into chart)    Subjective   Some of her shoulder exercises aggravate her back.  She brought her pictures from home to review.  Her neck is feeling stiff.  She isn't really getting pain in the L 2 area, more pins and needles.  Her old injury from 2yrs ago is more mid thoracic paraspinals.      Pain:     Current: 3-4/10  Best: 0/10  Worse 5/10    Objective:  6/20:  Thoracic PA assessment: mid to upper thoracic mobility very limited.  Palpation:  TTP to the R rhomboid/MT, thoracolumbar paraspinals and QL area.    IE:  Posture: decreased lordosis, L iliac crest elevated.    Transfers:  independent     Gait: mildly antalgic, mild scissoring R     Lumbar ROM:  Flexion WNL; no increased pain, hands to floor  Extension % limitations, avoids extending from the R, has R pain with extension  SG 25% limitation B, no increased pain.    LE MMT    R/L    hip flexion 4/4+  Ext     3+/ 4      IR 4/4+      ER 5/5  knee ext 5/5     flex 4/5  ankle DF 5/5     PF 5/5    INV 4/4    EV 5/5  Toe ext  3+/3+    Slump -B  SLR:  110 B    repeated movements:     RFIS: pain: 5/10 lateral paraspinals L2-S1 with tingling; x10: after movement: 5/10: no tingling, L4/5 areas    Lumbar ROM:  Flexion WNL; no increased pain,  hands to floor  Extension % limitations, has R pain with extension  SG 0% limitation B, no increased pain.    LE MMT    R/L    hip flexion 4+/4+  Ext     3+/ 4      IR 4/4+      ER 5/5  knee ext 5/5     flex 4+/5  ankle DF 5/5     PF 5/5    INV 4+/5    EV 5/5  Toe ext  4/3+    Attempted CATALINO: pain increased    Outcome Measure:     Other Measures  Oswestry Disablity Index (JULIANN): 32%     Treatment:  TE: 25'  Scifit Ran L1x5'  Review of s/s  objective  Manual  Sidelying thoracic rotation x10/side  Swiss ball roll out thoracic extension, stop d.t. not feeling it.  Seated thoracic rotation 10x/side     Manual: 20'  STM/DTM to paraspinals, midtrap and rhomboid  Prone thoracic PA's and rib PA's     EDUCATION:    Access Code: [email or web address]    Assessment:   Alexandra tx well.  She is very stiff in her thoracic PA mobility.  Her pain seems to have 3 different etiologies: mid scapular and thoracic paraspinals, lat/QL, and lower thoracic/upper lumbar radic.  Her symptoms were not aggravated with thoracic mobility ex today.       PT Assessment Results: Decreased strength, Decreased range of motion, Pain     Plan:  Assess response to thoracic work today.  C.w. manual as needed; scapular and core strengthening.  May benefit from traction.     Treatment/Interventions: Aquatic therapy, Dry needling, Education/ Instruction, Electrical stimulation, Hot pack, Manual therapy, Mechanical traction, Neuromuscular re-education, Self care/ home management, Therapeutic exercises     Goals: Set and discussed today    1. Pt will be independent with ADLs reaching to top shelf in kitchen and sleeping without interruption from pain  2. Pt will be independent with HEP  3. Pt will improve ROM of lumbar extension by at least 25%  4. Pt will improve strength of LE to B 5/5  5.  Pt will report at least 16% improvement on JULIANN  6. Pt will report returning to gardening and walking with her neighbors with 0-2/10 pain    Plan of care was developed with input and  agreement by the patient      Dania Ku, PT

## 2024-06-24 ENCOUNTER — TREATMENT (OUTPATIENT)
Dept: PHYSICAL THERAPY | Facility: CLINIC | Age: 79
End: 2024-06-24
Payer: MEDICARE

## 2024-06-24 ENCOUNTER — HOSPITAL ENCOUNTER (OUTPATIENT)
Dept: RADIOLOGY | Facility: CLINIC | Age: 79
Discharge: HOME | End: 2024-06-24
Payer: MEDICARE

## 2024-06-24 ENCOUNTER — APPOINTMENT (OUTPATIENT)
Dept: ORTHOPEDIC SURGERY | Facility: CLINIC | Age: 79
End: 2024-06-24
Payer: MEDICARE

## 2024-06-24 DIAGNOSIS — M54.41 CHRONIC BILATERAL LOW BACK PAIN WITH RIGHT-SIDED SCIATICA: ICD-10-CM

## 2024-06-24 DIAGNOSIS — M48.061 SPINAL STENOSIS OF LUMBAR REGION WITHOUT NEUROGENIC CLAUDICATION: ICD-10-CM

## 2024-06-24 DIAGNOSIS — G89.29 CHRONIC BILATERAL LOW BACK PAIN WITH RIGHT-SIDED SCIATICA: ICD-10-CM

## 2024-06-24 DIAGNOSIS — M48.061 SPINAL STENOSIS OF LUMBAR REGION WITHOUT NEUROGENIC CLAUDICATION: Primary | ICD-10-CM

## 2024-06-24 PROCEDURE — 99214 OFFICE O/P EST MOD 30 MIN: CPT | Performed by: ORTHOPAEDIC SURGERY

## 2024-06-24 PROCEDURE — 72100 X-RAY EXAM L-S SPINE 2/3 VWS: CPT | Performed by: RADIOLOGY

## 2024-06-24 PROCEDURE — 1157F ADVNC CARE PLAN IN RCRD: CPT | Performed by: ORTHOPAEDIC SURGERY

## 2024-06-24 PROCEDURE — 97140 MANUAL THERAPY 1/> REGIONS: CPT | Mod: GP

## 2024-06-24 PROCEDURE — 72100 X-RAY EXAM L-S SPINE 2/3 VWS: CPT

## 2024-06-24 PROCEDURE — 97014 ELECTRIC STIMULATION THERAPY: CPT | Mod: GP

## 2024-06-24 PROCEDURE — 1159F MED LIST DOCD IN RCRD: CPT | Performed by: ORTHOPAEDIC SURGERY

## 2024-06-24 ASSESSMENT — PAIN SCALES - GENERAL: PAINLEVEL_OUTOF10: 5 - MODERATE PAIN

## 2024-06-24 ASSESSMENT — PAIN - FUNCTIONAL ASSESSMENT: PAIN_FUNCTIONAL_ASSESSMENT: 0-10

## 2024-06-24 ASSESSMENT — PAIN DESCRIPTION - DESCRIPTORS: DESCRIPTORS: PINS AND NEEDLES;BURNING

## 2024-06-24 NOTE — LETTER
June 24, 2024     Brenden Milner MD  [address] [facility] [address]    Patient: Yasmine Ortiz   YOB: 1945   Date of Visit: 6/24/2024       Dear Dr. Brenden Milner MD:    Thank you for referring Yasmine Ortiz to me for evaluation. Below are my notes for this consultation.  If you have questions, please do not hesitate to call me. I look forward to following your patient along with you.       Sincerely,     Derick Gilmore MD      CC: Elizabeth Grigsby PA-C  ______________________________________________________________________________________    This very pleasant healthy 78-year-old woman is referred by a friend of hers who is a patient of mine.    She has had a 2-month history of right flank numbness and tingling.  She is quite active.  She exercises regularly.    No lower extremity symptoms.    She just started physical therapy and has done 2 sessions.    She had a very remote episode of low back pain but really since then no significant issues.    Her general health is excellent.    Family, social, and medical histories are obtained and reviewed.    30-point, patient-recorded Review of Systems is personally obtained and reviewed. Inclusive is no history of weight loss, change in appetite, recent change in activity level, change in bowel or bladder habits, fevers, chills, malaise, or night pain.    On exam very healthy-appearing woman who is here with younger than her stated age.  She has a stable gait.  Painless motion both hips.  Her strength is intact in the lower extremities.    CT scan of the lumbar spine films reveals moderate degenerative change with a coronal plane deformity.  No acute abnormality.    Impression: She has developed a recent right flank numbness and tingling perhaps radicular in nature.  Given her healthy appearance, normal exam and short duration of symptoms I would recommend a continued course of physical therapy with close observation.  If her symptoms were to  change or worsen in any severity or if they did not improve, she will contact us and we would consider further imaging.    I do not anticipate she would need to consider surgery.    We have discussed this at length and she has a good understanding and agrees with the plan.    ** Dictated with voice recognition software and not immediately reviewed for errors in grammar and/or spelling **

## 2024-06-25 NOTE — PROGRESS NOTES
This very pleasant healthy 78-year-old woman is referred by a friend of hers who is a patient of mine.    She has had a 2-month history of right flank numbness and tingling.  She is quite active.  She exercises regularly.    No lower extremity symptoms.    She just started physical therapy and has done 2 sessions.    She had a very remote episode of low back pain but really since then no significant issues.    Her general health is excellent.    Family, social, and medical histories are obtained and reviewed.    30-point, patient-recorded Review of Systems is personally obtained and reviewed. Inclusive is no history of weight loss, change in appetite, recent change in activity level, change in bowel or bladder habits, fevers, chills, malaise, or night pain.    On exam very healthy-appearing woman who is here with younger than her stated age.  She has a stable gait.  Painless motion both hips.  Her strength is intact in the lower extremities.    CT scan of the lumbar spine films reveals moderate degenerative change with a coronal plane deformity.  No acute abnormality.    Impression: She has developed a recent right flank numbness and tingling perhaps radicular in nature.  Given her healthy appearance, normal exam and short duration of symptoms I would recommend a continued course of physical therapy with close observation.  If her symptoms were to change or worsen in any severity or if they did not improve, she will contact us and we would consider further imaging.    I do not anticipate she would need to consider surgery.    We have discussed this at length and she has a good understanding and agrees with the plan.    ** Dictated with voice recognition software and not immediately reviewed for errors in grammar and/or spelling **

## 2024-06-26 ENCOUNTER — APPOINTMENT (OUTPATIENT)
Dept: ORTHOPEDIC SURGERY | Facility: HOSPITAL | Age: 79
End: 2024-06-26
Payer: MEDICARE

## 2024-06-28 ENCOUNTER — TELEPHONE (OUTPATIENT)
Dept: PRIMARY CARE | Facility: CLINIC | Age: 79
End: 2024-06-28
Payer: MEDICARE

## 2024-06-28 DIAGNOSIS — S33.8XXA SPRAIN OF GROIN, INITIAL ENCOUNTER: ICD-10-CM

## 2024-06-28 DIAGNOSIS — M54.40 CHRONIC BILATERAL LOW BACK PAIN WITH SCIATICA, SCIATICA LATERALITY UNSPECIFIED: Primary | ICD-10-CM

## 2024-06-28 DIAGNOSIS — G89.29 CHRONIC BILATERAL LOW BACK PAIN WITH SCIATICA, SCIATICA LATERALITY UNSPECIFIED: Primary | ICD-10-CM

## 2024-06-28 NOTE — TELEPHONE ENCOUNTER
Pt requesting a referral PT for her right leg with groin pain. She also was requesting a handicap placard due to her leg and issues relating to herniated discs in her back. She can reached at [phone] with any questions.

## 2024-07-08 NOTE — PROGRESS NOTES
Physical Therapy  Physical Therapy Treatment    Patient Name: Yasmine Ortiz  MRN: [id number]  Today's Date: 7/9/2024  Time Calculation  Start Time: 1000  Stop Time: 1045  Time Calculation (min): 45 min    PT Therapeutic Procedures Time Entry  Manual Therapy Time Entry: 10  Therapeutic Exercise Time Entry: 15 PT Modalities Time Entry  Mechanical Traction Time Entry: 12        Insurance:  Visit number: 4 of mn  Authorization info: no auth  Insurance Type: Payor: T MEDICARE / Plan: AET MEDICARE ASSURE / Product Type: *No Product type* /    MCR cert: 6/5/24---8/14/24    Current Problem  1. Spinal stenosis of lumbar region without neurogenic claudication  Follow Up In Physical Therapy        General:     Onset date:  5/5/24    Precautions:   CT: 5/14/24  Severe degenerative discogenic change at  L4-L5 with severe disc height loss, endplate sclerosis, and  subchondral cystic change. Additional mild multilevel degenerative  disc height loss with vacuum disc components at L3-L4.          Degenerative change:      T12-L1: Mild facet arthropathy. There is no significant spinal canal  stenosis or neural foraminal narrowing.      L1-2:  Mild facet arthropathy and ligamentum flavum hypertrophy. Mild  disc bulge/uncovering. No significant spinal canal stenosis. Mild  bilateral neural foraminal narrowing suspected.      L2-3:  Moderate to advanced facet arthropathy with ligamentum flavum  hypertrophy. Prominent dorsal epidural fat. Disc bulge/uncovering.  There is mild spinal canal stenosis suggested. Mild bilateral neural  foraminal narrowing.      L3-4:  Moderate facet arthropathy. Ligamentum flavum hypertrophy.  Prominent dorsal epidural fat. Disc uncovering/bulge. Lateral recess  narrowing with mild spinal canal stenosis suggested. Moderate  bilateral neural foraminal narrowing.      L4-5:  Severe facet arthropathy. Ligamentum flavum hypertrophy. Disc  uncovering and likely central disc extrusion components with  cranial  migration. Severe spinal canal stenosis. Severe bilateral neural  foraminal narrowing.      L5-S1:  Moderate left-greater-than-right facet arthropathy. Mild disc  bulge and endplate spurring. No CT apparent spinal canal stenosis.  Mild-to-moderate right and no significant left neural foraminal  narrowing.      Prevertebral/Paraspinal Soft Tissues: Questionable peripherally  calcified 1.4 cm splenic artery aneurysm (series 5, image 63). Severe  atherosclerotic calcification of the infrarenal abdominal aorta with  normal caliber. Suspected parapelvic left renal cyst, incompletely  characterized. Paraspinal soft tissues are otherwise within normal  limits.  STEADI - 1  Osteopenia   Medical History Form: Reviewed (scanned into chart)    Subjective   Patient reports that she is fair.  Its been 2 weeks since Brandie been here because of scheduling at .  Brandie been doing stretching.  I have pins and needles on R.  The tens didn't do anything     Pain:   Current: 3-4/10  Best: 0/10  Worse 5/10    Objective:  6/20:  Thoracic PA assessment: mid to upper thoracic mobility very limited.  Palpation:  TTP to the R rhomboid/MT, thoracolumbar paraspinals and QL area.    IE:  Posture: decreased lordosis, L iliac crest elevated.    Transfers:  independent     Gait: mildly antalgic, mild scissoring R     Lumbar ROM:  Flexion WNL; no increased pain, hands to floor  Extension % limitations, avoids extending from the R, has R pain with extension  SG 25% limitation B, no increased pain.    LE MMT    R/L    hip flexion 4/4+  Ext     3+/ 4      IR 4/4+      ER 5/5  knee ext 5/5     flex 4/5  ankle DF 5/5     PF 5/5    INV 4/4    EV 5/5  Toe ext  3+/3+    Slump -B  SLR:  110 B    repeated movements:     RFIS: pain: 5/10 lateral paraspinals L2-S1 with tingling; x10: after movement: 5/10: no tingling, L4/5 areas    Lumbar ROM:  Flexion WNL; no increased pain, hands to floor  Extension % limitations, has R pain with  "extension  SG 0% limitation B, no increased pain.    LE MMT    R/L    hip flexion 4+/4+  Ext     3+/ 4      IR 4/4+      ER 5/5  knee ext 5/5     flex 4+/5  ankle DF 5/5     PF 5/5    INV 4+/5    EV 5/5  Toe ext  4/3+    Attempted CATALINO: pain increased    Outcome Measure:     Other Measures  Oswestry Disablity Index (JULIANN): 32%     Treatment:  Scifit ran L2x6'    Mechanical lumbar traction 60#/20\"  60\" 20\" on off  table at 30\"  legs over wedge.  Therapist present during entire treatment.  Seated lumbar stretch with SB forward and rot  Review HEP    Manual: 10'  DTM to B lumbar and thoracic paraspinals in seated          EDUCATION:    Access Code: [email or web address]    Assessment:   Alexandra tx well.  She felt looser after traction and felt that she could sit straighter.     PT Assessment Results: Decreased strength, Decreased range of motion, Pain     Plan:  Assess response to traction.  C.w. manual as needed; scapular and core strengthening.  May benefit from traction.     Treatment/Interventions: Aquatic therapy, Dry needling, Education/ Instruction, Electrical stimulation, Hot pack, Manual therapy, Mechanical traction, Neuromuscular re-education, Self care/ home management, Therapeutic exercises     Goals: Set and discussed today    1. Pt will be independent with ADLs reaching to top shelf in kitchen and sleeping without interruption from pain  2. Pt will be independent with HEP  3. Pt will improve ROM of lumbar extension by at least 25%  4. Pt will improve strength of LE to B 5/5  5.  Pt will report at least 16% improvement on JULIANN  6. Pt will report returning to gardening and walking with her neighbors with 0-2/10 pain    Plan of care was developed with input and agreement by the patient      Radha Hsieh, PT  "

## 2024-07-09 ENCOUNTER — TREATMENT (OUTPATIENT)
Dept: PHYSICAL THERAPY | Facility: CLINIC | Age: 79
End: 2024-07-09
Payer: MEDICARE

## 2024-07-09 DIAGNOSIS — M48.061 SPINAL STENOSIS OF LUMBAR REGION WITHOUT NEUROGENIC CLAUDICATION: Primary | ICD-10-CM

## 2024-07-09 PROCEDURE — 97012 MECHANICAL TRACTION THERAPY: CPT | Mod: GP,CQ

## 2024-07-09 PROCEDURE — 97140 MANUAL THERAPY 1/> REGIONS: CPT | Mod: GP,59,CQ

## 2024-07-09 PROCEDURE — 97110 THERAPEUTIC EXERCISES: CPT | Mod: GP,CQ

## 2024-07-12 ENCOUNTER — OFFICE VISIT (OUTPATIENT)
Dept: VASCULAR SURGERY | Facility: HOSPITAL | Age: 79
End: 2024-07-12
Payer: MEDICARE

## 2024-07-12 VITALS
WEIGHT: 139 LBS | OXYGEN SATURATION: 95 % | HEART RATE: 84 BPM | BODY MASS INDEX: 26.24 KG/M2 | DIASTOLIC BLOOD PRESSURE: 101 MMHG | HEIGHT: 61 IN | SYSTOLIC BLOOD PRESSURE: 153 MMHG

## 2024-07-12 DIAGNOSIS — I72.8 SPLENIC ARTERY ANEURYSM (CMS-HCC): ICD-10-CM

## 2024-07-12 PROCEDURE — 1157F ADVNC CARE PLAN IN RCRD: CPT | Performed by: SURGERY

## 2024-07-12 PROCEDURE — 3080F DIAST BP >= 90 MM HG: CPT | Performed by: SURGERY

## 2024-07-12 PROCEDURE — 3074F SYST BP LT 130 MM HG: CPT | Performed by: SURGERY

## 2024-07-12 PROCEDURE — 99204 OFFICE O/P NEW MOD 45 MIN: CPT | Performed by: SURGERY

## 2024-07-12 PROCEDURE — 99214 OFFICE O/P EST MOD 30 MIN: CPT | Performed by: SURGERY

## 2024-07-12 PROCEDURE — 1159F MED LIST DOCD IN RCRD: CPT | Performed by: SURGERY

## 2024-07-12 ASSESSMENT — ENCOUNTER SYMPTOMS
DEPRESSION: 0
LOSS OF SENSATION IN FEET: 0
OCCASIONAL FEELINGS OF UNSTEADINESS: 0

## 2024-07-15 PROBLEM — M48.061 SPINAL STENOSIS OF LUMBAR REGION WITHOUT NEUROGENIC CLAUDICATION: Status: RESOLVED | Noted: 2024-06-05 | Resolved: 2024-07-15

## 2024-07-15 NOTE — PROGRESS NOTES
Physical Therapy  Physical Therapy Treatment    Patient Name: Yasmine Ortiz  MRN: [id number]  Today's Date: 7/16/2024  Time Calculation  Start Time: 0915  Stop Time: 1000  Time Calculation (min): 45 min    PT Therapeutic Procedures Time Entry  Manual Therapy Time Entry: 15  Therapeutic Exercise Time Entry: 10 PT Modalities Time Entry  Mechanical Traction Time Entry: 12        Insurance:  Visit number: 5 of mn  Authorization info: no auth  Insurance Type: Payor: T MEDICARE / Plan: [employer] MEDICARE ASSURE / Product Type: *No Product type* /    MCR cert: 6/5/24---8/14/24    Current Problem  1. Stenosis, spinal, lumbar        2. Spinal stenosis of lumbar region without neurogenic claudication  Follow Up In Physical Therapy        General:     Onset date:  5/5/24    Precautions:   CT: 5/14/24  Severe degenerative discogenic change at  L4-L5 with severe disc height loss, endplate sclerosis, and  subchondral cystic change. Additional mild multilevel degenerative  disc height loss with vacuum disc components at L3-L4.          Degenerative change:      T12-L1: Mild facet arthropathy. There is no significant spinal canal  stenosis or neural foraminal narrowing.      L1-2:  Mild facet arthropathy and ligamentum flavum hypertrophy. Mild  disc bulge/uncovering. No significant spinal canal stenosis. Mild  bilateral neural foraminal narrowing suspected.      L2-3:  Moderate to advanced facet arthropathy with ligamentum flavum  hypertrophy. Prominent dorsal epidural fat. Disc bulge/uncovering.  There is mild spinal canal stenosis suggested. Mild bilateral neural  foraminal narrowing.      L3-4:  Moderate facet arthropathy. Ligamentum flavum hypertrophy.  Prominent dorsal epidural fat. Disc uncovering/bulge. Lateral recess  narrowing with mild spinal canal stenosis suggested. Moderate  bilateral neural foraminal narrowing.      L4-5:  Severe facet arthropathy. Ligamentum flavum hypertrophy. Disc  uncovering and likely central  disc extrusion components with cranial  migration. Severe spinal canal stenosis. Severe bilateral neural  foraminal narrowing.      L5-S1:  Moderate left-greater-than-right facet arthropathy. Mild disc  bulge and endplate spurring. No CT apparent spinal canal stenosis.  Mild-to-moderate right and no significant left neural foraminal  narrowing.      Prevertebral/Paraspinal Soft Tissues: Questionable peripherally  calcified 1.4 cm splenic artery aneurysm (series 5, image 63). Severe  atherosclerotic calcification of the infrarenal abdominal aorta with  normal caliber. Suspected parapelvic left renal cyst, incompletely  characterized. Paraspinal soft tissues are otherwise within normal  limits.  STEADI - 1  Osteopenia   Medical History Form: Reviewed (scanned into chart)    Subjective   Patient reports that the pins and needles are still there but what Brandie noticed that the radicular symptoms are from centralizing to side and back and not some much to belly button.  I think the traction helped.      Pain:   Current: 3-4/10  Best: 0/10  Worse 5/10    Objective:  6/20:  Thoracic PA assessment: mid to upper thoracic mobility very limited.  Palpation:  TTP to the R rhomboid/MT, thoracolumbar paraspinals and QL area.    IE:  Posture: decreased lordosis, L iliac crest elevated.    Transfers:  independent     Gait: mildly antalgic, mild scissoring R     Lumbar ROM:  Flexion WNL; no increased pain, hands to floor  Extension % limitations, avoids extending from the R, has R pain with extension  SG 25% limitation B, no increased pain.    LE MMT    R/L    hip flexion 4/4+  Ext     3+/ 4      IR 4/4+      ER 5/5  knee ext 5/5     flex 4/5  ankle DF 5/5     PF 5/5    INV 4/4    EV 5/5  Toe ext  3+/3+    Slump -B  SLR:  110 B    repeated movements:     RFIS: pain: 5/10 lateral paraspinals L2-S1 with tingling; x10: after movement: 5/10: no tingling, L4/5 areas    Lumbar ROM:  Flexion WNL; no increased pain, hands to  "floor  Extension % limitations, has R pain with extension  SG 0% limitation B, no increased pain.    LE MMT    R/L    hip flexion 4+/4+  Ext     3+/ 4      IR 4/4+      ER 5/5  knee ext 5/5     flex 4+/5  ankle DF 5/5     PF 5/5    INV 4+/5    EV 5/5  Toe ext  4/3+    Attempted CATALINO: pain increased    Outcome Measure:     Other Measures  Oswestry Disablity Index (JULIANN): 32%     Treatment:  Scifit ran L2x6'    Mechanical lumbar traction 60#/20\"  60\" 20\" on off  table at 30\"  legs over wedge.  Therapist present during entire treatment.  Seated lumbar stretch with SB forward and rot  LTR after traction  DKTC  Review HEP    Manual: 10'  DTM to B lumbar and thoracic paraspinals in seated          EDUCATION:    Access Code: [email or web address]    Assessment:   Alexandra tx well.  She felt looser after traction and felt that she could sit straighter.     PT Assessment Results: Decreased strength, Decreased range of motion, Pain     Plan:  Assess response to traction and DTM to hamstrings.   Treatment/Interventions: Aquatic therapy, Dry needling, Education/ Instruction, Electrical stimulation, Hot pack, Manual therapy, Mechanical traction, Neuromuscular re-education, Self care/ home management, Therapeutic exercises     Goals: Set and discussed today    1. Pt will be independent with ADLs reaching to top shelf in kitchen and sleeping without interruption from pain  2. Pt will be independent with HEP  3. Pt will improve ROM of lumbar extension by at least 25%  4. Pt will improve strength of LE to B 5/5  5.  Pt will report at least 16% improvement on JULIANN  6. Pt will report returning to gardening and walking with her neighbors with 0-2/10 pain    Plan of care was developed with input and agreement by the patient      Radha Hsieh, PT  "

## 2024-07-16 ENCOUNTER — TREATMENT (OUTPATIENT)
Dept: PHYSICAL THERAPY | Facility: CLINIC | Age: 79
End: 2024-07-16
Payer: MEDICARE

## 2024-07-16 DIAGNOSIS — M48.061 SPINAL STENOSIS OF LUMBAR REGION WITHOUT NEUROGENIC CLAUDICATION: ICD-10-CM

## 2024-07-16 DIAGNOSIS — M48.061 STENOSIS, SPINAL, LUMBAR: Primary | ICD-10-CM

## 2024-07-16 PROCEDURE — 97012 MECHANICAL TRACTION THERAPY: CPT | Mod: GP,CQ

## 2024-07-16 PROCEDURE — 97110 THERAPEUTIC EXERCISES: CPT | Mod: GP,CQ

## 2024-07-16 PROCEDURE — 97140 MANUAL THERAPY 1/> REGIONS: CPT | Mod: GP,59,CQ

## 2024-07-17 NOTE — PROGRESS NOTES
Vascular Surgery Clinic Note    Subjective    History of Present Illness  Yasmine Ortiz is a pleasant 78 y.o. female presenting to clinic for evaluation of an incidentally found splenic artery aneurysm on CT of the spine. She is asymptomatic and had not known of the aneurysm previously. No prior history of aneurysmal disease in herself or family. No history of stroke, TIA, or amaurosis fugax.     Past Medical History  Ms. Yasmine Ortiz has a past medical history of Abnormal finding of blood chemistry, unspecified (02/21/2014), History of falling, Other specified noninflammatory disorders of vagina (06/19/2014), Pain in unspecified wrist (03/05/2014), Personal history of other diseases of the circulatory system, Personal history of other diseases of the musculoskeletal system and connective tissue, Personal history of other diseases of the respiratory system (04/25/2014), Personal history of other endocrine, nutritional and metabolic disease, Personal history of other specified conditions (06/05/2013), Primary osteoarthritis, unspecified wrist (05/14/2014), and Pure hypercholesterolemia, unspecified (06/09/2013).     Past Surgical History  Ms. Yasmine Ortiz has a past surgical history that includes Total shoulder arthroplasty (07/12/2018); Mouth surgery (10/23/2014); Cataract extraction (01/07/2014); Total abdominal hysterectomy w/ bilateral salpingoophorectomy (01/07/2014); and Breast biopsy (01/07/2014).     Social History  Social History     Tobacco Use    Smoking status: Never     Passive exposure: Never    Smokeless tobacco: Never   Substance Use Topics    Alcohol use: Never       Medications  Current Outpatient Medications   Medication Instructions    azelastine (Astelin) 137 mcg (0.1 %) nasal spray 1 spray, Each Nostril, 2 times daily, Use in each nostril as directed    cholecalciferol (VITAMIN D-3) 1,000 Units, oral, Daily RT    diclofenac sodium (Voltaren) 1 % gel gel PLEASE SEE ATTACHED FOR DETAILED  "DIRECTIONS    estradiol (ESTRACE) 0.5 g, vaginal, 2 times weekly    levothyroxine (SYNTHROID, LEVOXYL) 112 mcg, oral, Daily before breakfast    LORazepam (ATIVAN) 0.5 mg, oral, Daily    metoprolol succinate XL (TOPROL-XL) 50 mg, oral, Daily    metoprolol tartrate (LOPRESSOR) 25 mg, oral, Daily, Prn palpitations    multivitamin tablet 1 tablet, oral, Daily    pravastatin (PRAVACHOL) 80 mg, oral, Nightly    pseudoephedrine-guaifenesin (Mucinex D)  mg 12 hr tablet 1 tablet, oral, Daily PRN, Do not crush, chew, or split.        Allergies  Allergies   Allergen Reactions    Tree Nut Other and Swelling     Had some tingling on side of tongue    Hydrocod-Pheniramine-Pe-Ppa-Gg Headache    Hydrocodone Other     Headache    Lactase Diarrhea    Nut - Unspecified Swelling    Sulfa (Sulfonamide Antibiotics) Rash           Objective   Last Recorded Vitals   Blood pressure (!) 153/101, pulse 84, height 1.549 m (5' 1\"), weight 63 kg (139 lb), SpO2 95%, not currently breastfeeding.    Physical Exam  CONSTITUTIONAL: Alert and oriented. No acute distress. Well-nourished. Well-groomed.  EYES: No scleral icterus. Conjunctiva clear.  ENMT: No rhinorrhea. Moist oral mucosa.   NECK: Supple. No JVD. No pre- or post-auricular, cervical, supra- or infra-clavicular lymphadenopathy.   THORACIC: Symmetrical expansion and chest rise.  RESPIRATORY: Normal effort on room air. No stridor.  ABDOMINAL: Slightly protuberant. Nondistended. Soft. Nontender to palpation. Liver edge smooth.   SKIN: Normal turgor. No rashes or ulcers.   MUSCULOSKELETAL: Normal strength and tone.  EXTREMITIES: No digital clubbing or cyanosis. No gross deformity of the extremities.  NEUROLOGICAL: Grossly intact. No focal deficits. Moves all extremities spontaneously.  PSYCHIATRIC: Appropriate mood and affect.     CARDIAC: Regular rate and rhythm on the monitor.  VASCULAR:  Normal carotid upstroke.   Normal, symmetric subclavian arteries on palpation.  Capillary refill < " "2 sec  No lower extremity edema  Symmetric, palpable bilateral radial & femoral pulses  Symmetric, palpable bilateral dorsalis pedis and posterior tibial pulses    Labs   No results found for: \"NA\", \"K\", \"BICARB\", \"CL\", \"BUN\", \"CREATININE\", \"GLU\"   No results found for: \"WBC\", \"HGB\", \"HCT\", \"LABPLAT\"     Imaging   Imaging was reviewed independently by the vascular surgery team.        Assessment/Plan   Yasmine Ortiz is a pleasant 78 y.o. female with an asymptomatic 1.4 cm splenic artery aneurysm. Aneurysm of small size and low risk of rupture. No indication for repair at this time.     Plan   Follow-up in 2 years with mesenteric duplex.         Patient seen and discussed with the attending, Dr. Strange.    Joce Dowell MD  Vascular Surgery Fellow  Available on Secure Chat    I saw and evaluated the patient. I personally obtained the key and critical portions of the history and physical exam or was physically present for key and critical portions performed by the resident/fellow. I reviewed the resident/fellow's documentation and discussed the patient with the resident/fellow. I agree with the resident/fellow's medical decision making as documented in the note.    Benjamin Strange MD  Professor & Chief, Division of Vascular Surgery & Endovascular Therapy      "

## 2024-07-18 ENCOUNTER — TREATMENT (OUTPATIENT)
Dept: PHYSICAL THERAPY | Facility: CLINIC | Age: 79
End: 2024-07-18
Payer: MEDICARE

## 2024-07-18 DIAGNOSIS — M48.061 SPINAL STENOSIS OF LUMBAR REGION WITHOUT NEUROGENIC CLAUDICATION: ICD-10-CM

## 2024-07-18 DIAGNOSIS — M48.061 STENOSIS, SPINAL, LUMBAR: Primary | ICD-10-CM

## 2024-07-18 PROCEDURE — 97012 MECHANICAL TRACTION THERAPY: CPT | Mod: GP,CQ

## 2024-07-18 PROCEDURE — 97110 THERAPEUTIC EXERCISES: CPT | Mod: GP,CQ

## 2024-07-18 PROCEDURE — 97140 MANUAL THERAPY 1/> REGIONS: CPT | Mod: GP,59,CQ

## 2024-07-18 NOTE — PROGRESS NOTES
Physical Therapy  Physical Therapy Treatment    Patient Name: Yasmine Ortiz  MRN: [id number]  Today's Date: 7/18/2024  Time Calculation  Start Time: 0915  Stop Time: 1000  Time Calculation (min): 45 min    PT Therapeutic Procedures Time Entry  Manual Therapy Time Entry: 10  Therapeutic Exercise Time Entry: 13 PT Modalities Time Entry  Mechanical Traction Time Entry: 15        Insurance:  Visit number: 6 of mn  Authorization info: no auth  Insurance Type: Payor: T MEDICARE / Plan: [employer] MEDICARE ASSURE / Product Type: *No Product type* /    MCR cert: 6/5/24---8/14/24    Current Problem  1. Stenosis, spinal, lumbar        2. Spinal stenosis of lumbar region without neurogenic claudication  Follow Up In Physical Therapy        General:     Onset date:  5/5/24    Precautions:   CT: 5/14/24  Severe degenerative discogenic change at  L4-L5 with severe disc height loss, endplate sclerosis, and  subchondral cystic change. Additional mild multilevel degenerative  disc height loss with vacuum disc components at L3-L4.          Degenerative change:      T12-L1: Mild facet arthropathy. There is no significant spinal canal  stenosis or neural foraminal narrowing.      L1-2:  Mild facet arthropathy and ligamentum flavum hypertrophy. Mild  disc bulge/uncovering. No significant spinal canal stenosis. Mild  bilateral neural foraminal narrowing suspected.      L2-3:  Moderate to advanced facet arthropathy with ligamentum flavum  hypertrophy. Prominent dorsal epidural fat. Disc bulge/uncovering.  There is mild spinal canal stenosis suggested. Mild bilateral neural  foraminal narrowing.      L3-4:  Moderate facet arthropathy. Ligamentum flavum hypertrophy.  Prominent dorsal epidural fat. Disc uncovering/bulge. Lateral recess  narrowing with mild spinal canal stenosis suggested. Moderate  bilateral neural foraminal narrowing.      L4-5:  Severe facet arthropathy. Ligamentum flavum hypertrophy. Disc  uncovering and likely central  disc extrusion components with cranial  migration. Severe spinal canal stenosis. Severe bilateral neural  foraminal narrowing.      L5-S1:  Moderate left-greater-than-right facet arthropathy. Mild disc  bulge and endplate spurring. No CT apparent spinal canal stenosis.  Mild-to-moderate right and no significant left neural foraminal  narrowing.      Prevertebral/Paraspinal Soft Tissues: Questionable peripherally  calcified 1.4 cm splenic artery aneurysm (series 5, image 63). Severe  atherosclerotic calcification of the infrarenal abdominal aorta with  normal caliber. Suspected parapelvic left renal cyst, incompletely  characterized. Paraspinal soft tissues are otherwise within normal  limits.  STEADI - 1  Osteopenia   Medical History Form: Reviewed (scanned into chart)    Subjective   Patient reports that she still thinks traction is helping.  Her pins and needles is more toward her back.  Intensity of pain hasn't changed just the location.   Pain:   Current: 3-4/10  Best: 0/10  Worse 5/10    Objective:  6/20:  Thoracic PA assessment: mid to upper thoracic mobility very limited.  Palpation:  TTP to the R rhomboid/MT, thoracolumbar paraspinals and QL area.    IE:  Posture: decreased lordosis, L iliac crest elevated.    Transfers:  independent     Gait: mildly antalgic, mild scissoring R     Lumbar ROM:  Flexion WNL; no increased pain, hands to floor  Extension % limitations, avoids extending from the R, has R pain with extension  SG 25% limitation B, no increased pain.    LE MMT    R/L    hip flexion 4/4+  Ext     3+/ 4      IR 4/4+      ER 5/5  knee ext 5/5     flex 4/5  ankle DF 5/5     PF 5/5    INV 4/4    EV 5/5  Toe ext  3+/3+    Slump -B  SLR:  110 B    repeated movements:     RFIS: pain: 5/10 lateral paraspinals L2-S1 with tingling; x10: after movement: 5/10: no tingling, L4/5 areas    Lumbar ROM:  Flexion WNL; no increased pain, hands to floor  Extension % limitations, has R pain with  "extension  SG 0% limitation B, no increased pain.    LE MMT    R/L    hip flexion 4+/4+  Ext     3+/ 4      IR 4/4+      ER 5/5  knee ext 5/5     flex 4+/5  ankle DF 5/5     PF 5/5    INV 4+/5    EV 5/5  Toe ext  4/3+    Attempted CATALINO: pain increased    Outcome Measure:     Other Measures  Oswestry Disablity Index (JULIANN): 32%     Treatment:    Mechanical lumbar traction 65#/20\"  60\" 20\" on off  x 15 min table at 30\"  legs over wedge.  Therapist present during entire treatment.  Seated lumbar stretch with SB forward and rot  LTR after traction  DKTC  Review HEP    Manual: 10'  DTM to thoracic paraspinals.          EDUCATION:    Access Code: [email or web address]    Assessment:   Alexandra tx well.  She continues to  respond well to traction with centralization.     PT Assessment Results: Decreased strength, Decreased range of motion, Pain     Plan:  Scapular strengthening and thoracic mobility    Treatment/Interventions: Aquatic therapy, Dry needling, Education/ Instruction, Electrical stimulation, Hot pack, Manual therapy, Mechanical traction, Neuromuscular re-education, Self care/ home management, Therapeutic exercises     Goals: Set and discussed today    1. Pt will be independent with ADLs reaching to top shelf in kitchen and sleeping without interruption from pain  2. Pt will be independent with HEP  3. Pt will improve ROM of lumbar extension by at least 25%  4. Pt will improve strength of LE to B 5/5  5.  Pt will report at least 16% improvement on JULIANN  6. Pt will report returning to gardening and walking with her neighbors with 0-2/10 pain    Plan of care was developed with input and agreement by the patient      Radha Hsieh, PT  "

## 2024-07-22 ENCOUNTER — HOSPITAL ENCOUNTER (OUTPATIENT)
Dept: RADIOLOGY | Facility: CLINIC | Age: 79
Discharge: HOME | End: 2024-07-22
Payer: MEDICARE

## 2024-07-22 VITALS — BODY MASS INDEX: 26.22 KG/M2 | WEIGHT: 138.89 LBS | HEIGHT: 61 IN

## 2024-07-22 DIAGNOSIS — Z12.31 ENCOUNTER FOR SCREENING MAMMOGRAM FOR BREAST CANCER: ICD-10-CM

## 2024-07-22 PROCEDURE — 77067 SCR MAMMO BI INCL CAD: CPT

## 2024-07-22 PROCEDURE — 77067 SCR MAMMO BI INCL CAD: CPT | Performed by: RADIOLOGY

## 2024-07-22 PROCEDURE — 77063 BREAST TOMOSYNTHESIS BI: CPT | Performed by: RADIOLOGY

## 2024-07-24 ENCOUNTER — APPOINTMENT (OUTPATIENT)
Dept: ORTHOPEDIC SURGERY | Facility: HOSPITAL | Age: 79
End: 2024-07-24
Payer: MEDICARE

## 2024-07-25 ENCOUNTER — TREATMENT (OUTPATIENT)
Dept: PHYSICAL THERAPY | Facility: CLINIC | Age: 79
End: 2024-07-25
Payer: MEDICARE

## 2024-07-25 DIAGNOSIS — M48.061 SPINAL STENOSIS OF LUMBAR REGION WITHOUT NEUROGENIC CLAUDICATION: ICD-10-CM

## 2024-07-25 PROCEDURE — 97110 THERAPEUTIC EXERCISES: CPT | Mod: GP

## 2024-07-25 PROCEDURE — 97012 MECHANICAL TRACTION THERAPY: CPT | Mod: GP

## 2024-07-25 NOTE — PROGRESS NOTES
Physical Therapy  Physical Therapy Treatment    Patient Name: Yasmine Ortiz  MRN: [id number]  Today's Date: 7/25/2024  Time Calculation  Start Time: 0915  Stop Time: 1009  Time Calculation (min): 54 min    PT Therapeutic Procedures Time Entry  Therapeutic Exercise Time Entry: 38 PT Modalities Time Entry  Mechanical Traction Time Entry: 15        Insurance:  Visit number: 6 of mn  Authorization info: no auth  Insurance Type: Payor: T MEDICARE / Plan: [employer] MEDICARE ASSURE / Product Type: *No Product type* /    MCR cert: 6/5/24---8/14/24    Current Problem  1. Spinal stenosis of lumbar region without neurogenic claudication  Follow Up In Physical Therapy        General:     Onset date:  5/5/24    Precautions:   CT: 5/14/24  Severe degenerative discogenic change at  L4-L5 with severe disc height loss, endplate sclerosis, and  subchondral cystic change. Additional mild multilevel degenerative  disc height loss with vacuum disc components at L3-L4.          Degenerative change:      T12-L1: Mild facet arthropathy. There is no significant spinal canal  stenosis or neural foraminal narrowing.      L1-2:  Mild facet arthropathy and ligamentum flavum hypertrophy. Mild  disc bulge/uncovering. No significant spinal canal stenosis. Mild  bilateral neural foraminal narrowing suspected.      L2-3:  Moderate to advanced facet arthropathy with ligamentum flavum  hypertrophy. Prominent dorsal epidural fat. Disc bulge/uncovering.  There is mild spinal canal stenosis suggested. Mild bilateral neural  foraminal narrowing.      L3-4:  Moderate facet arthropathy. Ligamentum flavum hypertrophy.  Prominent dorsal epidural fat. Disc uncovering/bulge. Lateral recess  narrowing with mild spinal canal stenosis suggested. Moderate  bilateral neural foraminal narrowing.      L4-5:  Severe facet arthropathy. Ligamentum flavum hypertrophy. Disc  uncovering and likely central disc extrusion components with cranial  migration. Severe spinal  canal stenosis. Severe bilateral neural  foraminal narrowing.      L5-S1:  Moderate left-greater-than-right facet arthropathy. Mild disc  bulge and endplate spurring. No CT apparent spinal canal stenosis.  Mild-to-moderate right and no significant left neural foraminal  narrowing.      Prevertebral/Paraspinal Soft Tissues: Questionable peripherally  calcified 1.4 cm splenic artery aneurysm (series 5, image 63). Severe  atherosclerotic calcification of the infrarenal abdominal aorta with  normal caliber. Suspected parapelvic left renal cyst, incompletely  characterized. Paraspinal soft tissues are otherwise within normal  limits.  STEADI - 1  Osteopenia   Medical History Form: Reviewed (scanned into chart)    Subjective   Patient reports that she still thinks traction is helping.  Her pins and needles is more toward her back.  Intensity of pain hasn't changed just the location.   Pain:   Current: 3-4/10  Best: 0/10  Worse 5/10    Objective:  6/20:  Thoracic PA assessment: mid to upper thoracic mobility very limited.  Palpation:  TTP to the R rhomboid/MT, thoracolumbar paraspinals and QL area.    IE:  Posture: decreased lordosis, L iliac crest elevated.    Transfers:  independent     Gait: mildly antalgic, mild scissoring R     Lumbar ROM:  Flexion WNL; no increased pain, hands to floor  Extension % limitations, avoids extending from the R, has R pain with extension  SG 25% limitation B, no increased pain.    LE MMT    R/L    hip flexion 4/4+  Ext     3+/ 4      IR 4/4+      ER 5/5  knee ext 5/5     flex 4/5  ankle DF 5/5     PF 5/5    INV 4/4    EV 5/5  Toe ext  3+/3+    Slump -B  SLR:  110 B    repeated movements:     RFIS: pain: 5/10 lateral paraspinals L2-S1 with tingling; x10: after movement: 5/10: no tingling, L4/5 areas    Lumbar ROM:  Flexion WNL; no increased pain, hands to floor  Extension % limitations, has R pain with extension  SG 0% limitation B, no increased pain.    LE MMT    R/L    hip  "flexion 4+/4+  Ext     3+/ 4      IR 4/4+      ER 5/5  knee ext 5/5     flex 4+/5  ankle DF 5/5     PF 5/5    INV 4+/5    EV 5/5  Toe ext  4/3+    Attempted CATALINO: pain increased    Outcome Measure:     Other Measures  Oswestry Disablity Index (JULIANN): 32%     Treatment:  Traction: 15'  Mechanical lumbar traction 58#/30#,  60\" 20\" on off  x 15 min table at 30\"  legs over wedge.  Therapist present during entire treatment.    TE: 38'  Verbally review HEP  Seated thoracic extension over ball x10  Seated thoracic SB with ext over ball x10/side  Thoracic ext/lat str against wall x10  Lat pull out then down x10, yellow band, after, a lot of UT fatigue and not working in MT or LT so not added to HEP  Reviewed lat pull down for home with band over door     EDUCATION:    Access Code: [email or web address]    Assessment:   Alexandra tx well.  She reported no low back pain after traction with less poundage.  HEP was updated.  She initially had some tingling with the thoracic extension but this went completely away with more repetitions.     PT Assessment Results: Decreased strength, Decreased range of motion, Pain     Plan:  Scapular strengthening and thoracic mobility.  RECHECK NV.      Treatment/Interventions: Aquatic therapy, Dry needling, Education/ Instruction, Electrical stimulation, Hot pack, Manual therapy, Mechanical traction, Neuromuscular re-education, Self care/ home management, Therapeutic exercises     Goals: Set and discussed today    1. Pt will be independent with ADLs reaching to top shelf in kitchen and sleeping without interruption from pain  2. Pt will be independent with HEP  3. Pt will improve ROM of lumbar extension by at least 25%  4. Pt will improve strength of LE to B 5/5  5.  Pt will report at least 16% improvement on JULIANN  6. Pt will report returning to gardening and walking with her neighbors with 0-2/10 pain    Plan of care was developed with input and agreement by the patient      Dania Ku, PT  "

## 2024-08-05 ENCOUNTER — TELEPHONE (OUTPATIENT)
Dept: OBSTETRICS AND GYNECOLOGY | Facility: CLINIC | Age: 79
End: 2024-08-05
Payer: MEDICARE

## 2024-08-05 DIAGNOSIS — R92.30 DENSE BREAST: Primary | ICD-10-CM

## 2024-08-05 DIAGNOSIS — R92.2 DENSE BREAST: Primary | ICD-10-CM

## 2024-08-05 NOTE — TELEPHONE ENCOUNTER
Patient called and discussed dense breasts. She can not do an MRI because of something metal in her eyes. She would like to do a breast ultrasound. I placed an order and recommend she check with insurance.

## 2024-08-07 ENCOUNTER — APPOINTMENT (OUTPATIENT)
Dept: PHYSICAL THERAPY | Facility: CLINIC | Age: 79
End: 2024-08-07
Payer: MEDICARE

## 2024-08-12 NOTE — PROGRESS NOTES
Physical Therapy  Physical Therapy Treatment    Patient Name: Yasmine Ortiz  MRN: [id number]  Today's Date: 8/13/2024  Time Calculation  Start Time: 0915  Stop Time: 1000  Time Calculation (min): 45 min    PT Therapeutic Procedures Time Entry  Manual Therapy Time Entry: 40          Insurance:  Visit number: 7 of mn  Authorization info: no auth  Insurance Type: Payor: [facility] MEDICARE / Plan: [employer] MEDICARE ASSURE / Product Type: *No Product type* /    MCR cert: 6/5/24---8/14/24    Current Problem  1. Stenosis, spinal, lumbar        2. Spinal stenosis of lumbar region without neurogenic claudication  Follow Up In Physical Therapy        General:     Onset date:  5/5/24    Precautions:   CT: 5/14/24  Severe degenerative discogenic change at  L4-L5 with severe disc height loss, endplate sclerosis, and  subchondral cystic change. Additional mild multilevel degenerative  disc height loss with vacuum disc components at L3-L4.          Degenerative change:      T12-L1: Mild facet arthropathy. There is no significant spinal canal  stenosis or neural foraminal narrowing.      L1-2:  Mild facet arthropathy and ligamentum flavum hypertrophy. Mild  disc bulge/uncovering. No significant spinal canal stenosis. Mild  bilateral neural foraminal narrowing suspected.      L2-3:  Moderate to advanced facet arthropathy with ligamentum flavum  hypertrophy. Prominent dorsal epidural fat. Disc bulge/uncovering.  There is mild spinal canal stenosis suggested. Mild bilateral neural  foraminal narrowing.      L3-4:  Moderate facet arthropathy. Ligamentum flavum hypertrophy.  Prominent dorsal epidural fat. Disc uncovering/bulge. Lateral recess  narrowing with mild spinal canal stenosis suggested. Moderate  bilateral neural foraminal narrowing.      L4-5:  Severe facet arthropathy. Ligamentum flavum hypertrophy. Disc  uncovering and likely central disc extrusion components with cranial  migration. Severe spinal canal stenosis. Severe bilateral  neural  foraminal narrowing.      L5-S1:  Moderate left-greater-than-right facet arthropathy. Mild disc  bulge and endplate spurring. No CT apparent spinal canal stenosis.  Mild-to-moderate right and no significant left neural foraminal  narrowing.      Prevertebral/Paraspinal Soft Tissues: Questionable peripherally  calcified 1.4 cm splenic artery aneurysm (series 5, image 63). Severe  atherosclerotic calcification of the infrarenal abdominal aorta with  normal caliber. Suspected parapelvic left renal cyst, incompletely  characterized. Paraspinal soft tissues are otherwise within normal  limits.  STEADI - 1  Osteopenia   Medical History Form: Reviewed (scanned into chart)    Subjective   Patient reports that she is worse the last 2 weeks.  She is having more belly button pins and needles and more tightness in sides. She has had a lot of tightness in her neck and can't look down fully.  Pain:   Current: 3-4/10  Best: 0/10  Worse 5/10    Objective:  6/20:  Thoracic PA assessment: mid to upper thoracic mobility very limited.  Palpation:  TTP to the R rhomboid/MT, thoracolumbar paraspinals and QL area.    IE:  Posture: decreased lordosis, L iliac crest elevated.    Transfers:  independent     Gait: mildly antalgic, mild scissoring R     Lumbar ROM:  Flexion WNL; no increased pain, hands to floor  Extension % limitations, avoids extending from the R, has R pain with extension  SG 25% limitation B, no increased pain.    LE MMT    R/L    hip flexion 4/4+  Ext     3+/ 4      IR 4/4+      ER 5/5  knee ext 5/5     flex 4/5  ankle DF 5/5     PF 5/5    INV 4/4    EV 5/5  Toe ext  3+/3+    Slump -B  SLR:  110 B    repeated movements:     RFIS: pain: 5/10 lateral paraspinals L2-S1 with tingling; x10: after movement: 5/10: no tingling, L4/5 areas    Lumbar ROM:  Flexion WNL; no increased pain, hands to floor  Extension % limitations, has R pain with extension  SG 0% limitation B, no increased pain.    LE MMT     R/L    hip flexion 4+/4+  Ext     3+/ 4      IR 4/4+      ER 5/5  knee ext 5/5     flex 4+/5  ankle DF 5/5     PF 5/5    INV 4+/5    EV 5/5  Toe ext  4/3+    Attempted CATALINO: pain increased    Outcome Measure:     Other Measures  Oswestry Disablity Index (JULIANN): 32%     Treatment:    Manual:  (40')  Seated cervical DTM to B UT/LS  Supine cervical DTM, Gentle distraction of cervical spine, Manual cervical stretching, SOR  Prone DTM to thoracic paraspinals, PA mobs of thoracic spine      EDUCATION:    Access Code: [email or web address]    Assessment:   Patient s lumbar symptoms seem to have return and improvement is minimal.   May benefit from more active core stabilization and postural strengthening at this point.   Cervical muscles are tight and would benefit from stretching.   PT Assessment Results: Decreased strength, Decreased range of motion, Pain     Plan:  Core stabilization exercise  Scapular strengthening and thoracic mobility.  RECHECK NV.      Treatment/Interventions: Aquatic therapy, Dry needling, Education/ Instruction, Electrical stimulation, Hot pack, Manual therapy, Mechanical traction, Neuromuscular re-education, Self care/ home management, Therapeutic exercises     Goals: Set and discussed today    1. Pt will be independent with ADLs reaching to top shelf in kitchen and sleeping without interruption from pain  2. Pt will be independent with HEP  3. Pt will improve ROM of lumbar extension by at least 25%  4. Pt will improve strength of LE to B 5/5  5.  Pt will report at least 16% improvement on JULIANN  6. Pt will report returning to gardening and walking with her neighbors with 0-2/10 pain    Plan of care was developed with input and agreement by the patient      Radha Hsieh, PT

## 2024-08-13 ENCOUNTER — TREATMENT (OUTPATIENT)
Dept: PHYSICAL THERAPY | Facility: CLINIC | Age: 79
End: 2024-08-13
Payer: MEDICARE

## 2024-08-13 DIAGNOSIS — M48.061 SPINAL STENOSIS OF LUMBAR REGION WITHOUT NEUROGENIC CLAUDICATION: ICD-10-CM

## 2024-08-13 DIAGNOSIS — M48.061 STENOSIS, SPINAL, LUMBAR: Primary | ICD-10-CM

## 2024-08-13 PROCEDURE — 97140 MANUAL THERAPY 1/> REGIONS: CPT | Mod: GP,CQ

## 2024-08-21 ENCOUNTER — TREATMENT (OUTPATIENT)
Dept: PHYSICAL THERAPY | Facility: CLINIC | Age: 79
End: 2024-08-21
Payer: MEDICARE

## 2024-08-21 DIAGNOSIS — M48.061 SPINAL STENOSIS OF LUMBAR REGION, UNSPECIFIED WHETHER NEUROGENIC CLAUDICATION PRESENT: Primary | ICD-10-CM

## 2024-08-21 DIAGNOSIS — M48.061 SPINAL STENOSIS OF LUMBAR REGION WITHOUT NEUROGENIC CLAUDICATION: ICD-10-CM

## 2024-08-21 PROCEDURE — 97140 MANUAL THERAPY 1/> REGIONS: CPT | Mod: GP

## 2024-08-21 PROCEDURE — 97110 THERAPEUTIC EXERCISES: CPT | Mod: GP

## 2024-08-21 NOTE — PROGRESS NOTES
Physical Therapy  Physical Therapy Treatment    Patient Name: Yasmine Ortiz  MRN: [id number]  Today's Date: 8/21/2024  Time Calculation  Start Time: 1215  Stop Time: 1300  Time Calculation (min): 45 min    PT Therapeutic Procedures Time Entry  Manual Therapy Time Entry: 25  Therapeutic Exercise Time Entry: 20          Insurance:  Visit number: 8 of mn  Authorization info: no auth  Insurance Type: Payor: [facility] MEDICARE / Plan: A[employer] MEDICARE ASSURE / Product Type: *No Product type* /    MCR cert: 6/5/24---8/14/24    Current Problem  1. Spinal stenosis of lumbar region, unspecified whether neurogenic claudication present        2. Spinal stenosis of lumbar region without neurogenic claudication  Follow Up In Physical Therapy        General:     Onset date:  5/5/24    Precautions:   CT: 5/14/24  Severe degenerative discogenic change at  L4-L5 with severe disc height loss, endplate sclerosis, and  subchondral cystic change. Additional mild multilevel degenerative  disc height loss with vacuum disc components at L3-L4.          Degenerative change:      T12-L1: Mild facet arthropathy. There is no significant spinal canal  stenosis or neural foraminal narrowing.      L1-2:  Mild facet arthropathy and ligamentum flavum hypertrophy. Mild  disc bulge/uncovering. No significant spinal canal stenosis. Mild  bilateral neural foraminal narrowing suspected.      L2-3:  Moderate to advanced facet arthropathy with ligamentum flavum  hypertrophy. Prominent dorsal epidural fat. Disc bulge/uncovering.  There is mild spinal canal stenosis suggested. Mild bilateral neural  foraminal narrowing.      L3-4:  Moderate facet arthropathy. Ligamentum flavum hypertrophy.  Prominent dorsal epidural fat. Disc uncovering/bulge. Lateral recess  narrowing with mild spinal canal stenosis suggested. Moderate  bilateral neural foraminal narrowing.      L4-5:  Severe facet arthropathy. Ligamentum flavum hypertrophy. Disc  uncovering and likely central  disc extrusion components with cranial  migration. Severe spinal canal stenosis. Severe bilateral neural  foraminal narrowing.      L5-S1:  Moderate left-greater-than-right facet arthropathy. Mild disc  bulge and endplate spurring. No CT apparent spinal canal stenosis.  Mild-to-moderate right and no significant left neural foraminal  narrowing.      Prevertebral/Paraspinal Soft Tissues: Questionable peripherally  calcified 1.4 cm splenic artery aneurysm (series 5, image 63). Severe  atherosclerotic calcification of the infrarenal abdominal aorta with  normal caliber. Suspected parapelvic left renal cyst, incompletely  characterized. Paraspinal soft tissues are otherwise within normal  limits.  STEADI - 1  Osteopenia   Medical History Form: Reviewed (scanned into chart)    Subjective   Patient reports that she has had increased frequency of the tingling around the umbilicus since her last appt and feels like there is a rock in the R side of the lower thoracic spine.  She reports compliance with her HEP but it really has not been helping.      Pain:   Current: 3-4/10  Best: 0/10  Worse 5/10    Objective:  8/21:  Thoracic PA assessment: significant PA mobility deficits     6/20:  Thoracic PA assessment: mid to upper thoracic mobility very limited.  Palpation:  TTP to the R rhomboid/MT, thoracolumbar paraspinals and QL area.    IE:  Posture: decreased lordosis, L iliac crest elevated.    Transfers:  independent     Gait: mildly antalgic, mild scissoring R     Lumbar ROM:  Flexion WNL; no increased pain, hands to floor  Extension % limitations, avoids extending from the R, has R pain with extension  SG 25% limitation B, no increased pain.    LE MMT    R/L    hip flexion 4/4+  Ext     3+/ 4      IR 4/4+      ER 5/5  knee ext 5/5     flex 4/5  ankle DF 5/5     PF 5/5    INV 4/4    EV 5/5  Toe ext  3+/3+    Slump -B  SLR:  110 B    repeated movements:     RFIS: pain: 5/10 lateral paraspinals L2-S1 with tingling; x10:  after movement: 5/10: no tingling, L4/5 areas    Lumbar ROM:  Flexion WNL; no increased pain, hands to floor  Extension % limitations, has R pain with extension  SG 0% limitation B, no increased pain.    LE MMT    R/L    hip flexion 4+/4+  Ext     3+/ 4      IR 4/4+      ER 5/5  knee ext 5/5     flex 4+/5  ankle DF 5/5     PF 5/5    INV 4+/5    EV 5/5  Toe ext  4/3+    Attempted CATALINO: pain increased    Outcome Measure:     Other Measures  Oswestry Disablity Index (JULIANN): 32%     Treatment:  TE: 20  Scifit UE man L1x6'  Man  Review HEP: thoracic extension with elbows on the wall  Discussed different exercises to address thoracic extension mobility including sitting in a chair across the chair back, sitting and extending over the edge of a dresser, counter or table; standing and extending over the edge of a dresser  Supine self thoracic mobs with towel roll: issued to HEP.    Manual:  (25')  Prone thoracic and Rib PA mobs Gr 3    EDUCATION:    Access Code: [email or web address]    Assessment:   Pt demonstrates decreased thoracic PA mobility.  Her tingling and pain is lower thoracic, T10-12 levels with radiating to the umbilicus.  We discussed that these levels were not visualized on her CT scan.  She tolerated thoracic mobs and self mobs today with some R lateral thoracic spine soreness but not reproduction of her familiar pain and tingling.       PT Assessment Results: Decreased strength, Decreased range of motion, Pain     Plan:  Assess response to more consistent thoracic extension at home.       Treatment/Interventions: Aquatic therapy, Dry needling, Education/ Instruction, Electrical stimulation, Hot pack, Manual therapy, Mechanical traction, Neuromuscular re-education, Self care/ home management, Therapeutic exercises     Goals: Set and discussed today    1. Pt will be independent with ADLs reaching to top shelf in kitchen and sleeping without interruption from pain  2. Pt will be independent with HEP  3. Pt will  improve ROM of lumbar extension by at least 25%  4. Pt will improve strength of LE to B 5/5  5.  Pt will report at least 16% improvement on JULIANN  6. Pt will report returning to gardening and walking with her neighbors with 0-2/10 pain    Plan of care was developed with input and agreement by the patient      Dania Ku, PT

## 2024-08-28 ENCOUNTER — APPOINTMENT (OUTPATIENT)
Dept: PHYSICAL THERAPY | Facility: CLINIC | Age: 79
End: 2024-08-28
Payer: MEDICARE

## 2024-08-29 ENCOUNTER — APPOINTMENT (OUTPATIENT)
Dept: RADIOLOGY | Facility: HOSPITAL | Age: 79
End: 2024-08-29
Payer: MEDICARE

## 2024-09-24 DIAGNOSIS — E03.9 HYPOTHYROIDISM, UNSPECIFIED TYPE: ICD-10-CM

## 2024-09-27 DIAGNOSIS — E03.9 HYPOTHYROIDISM, UNSPECIFIED TYPE: ICD-10-CM

## 2024-09-27 RX ORDER — LEVOTHYROXINE SODIUM 112 UG/1
112 TABLET ORAL
Qty: 90 TABLET | Refills: 3 | OUTPATIENT
Start: 2024-09-27 | End: 2025-09-27

## 2024-09-27 RX ORDER — LEVOTHYROXINE SODIUM 112 UG/1
112 TABLET ORAL
Qty: 90 TABLET | Refills: 3 | Status: SHIPPED | OUTPATIENT
Start: 2024-09-27 | End: 2025-09-27

## 2024-10-02 ENCOUNTER — OFFICE VISIT (OUTPATIENT)
Dept: ORTHOPEDIC SURGERY | Facility: HOSPITAL | Age: 79
End: 2024-10-02
Payer: MEDICARE

## 2024-10-02 ENCOUNTER — HOSPITAL ENCOUNTER (OUTPATIENT)
Dept: RADIOLOGY | Facility: HOSPITAL | Age: 79
Discharge: HOME | End: 2024-10-02
Payer: MEDICARE

## 2024-10-02 DIAGNOSIS — M25.511 RIGHT SHOULDER PAIN, UNSPECIFIED CHRONICITY: ICD-10-CM

## 2024-10-02 PROCEDURE — 99214 OFFICE O/P EST MOD 30 MIN: CPT | Performed by: ORTHOPAEDIC SURGERY

## 2024-10-02 PROCEDURE — 73030 X-RAY EXAM OF SHOULDER: CPT | Mod: RT

## 2024-10-02 PROCEDURE — 1157F ADVNC CARE PLAN IN RCRD: CPT | Performed by: ORTHOPAEDIC SURGERY

## 2024-10-02 PROCEDURE — 1159F MED LIST DOCD IN RCRD: CPT | Performed by: ORTHOPAEDIC SURGERY

## 2024-10-02 PROCEDURE — 73030 X-RAY EXAM OF SHOULDER: CPT | Mod: RIGHT SIDE | Performed by: RADIOLOGY

## 2024-10-02 NOTE — PROGRESS NOTES
Patient is here for follow-up on right shoulder arthroplasty.  She had [employer] arthroplasty and was 10 years ago if she is doing generally quite well with regard to pain and function she is not having any instability symptoms.  She is aware of some specific weakness.    On exam today the patient can elevate to 170 degrees external rotation of 60 degrees internal rotation T12.  Motor power abduction 5 external rotation 4 internal rotation 5 radial pulse palpable.  X-rays show components in good position with no evidence of loss of fixation        Right shoulder glenohumeral arthritis patient is doing well status post arthroplasty.  She should focus on external rotation strengthening.  She will follow-up with again in 1 year        This was dictated using voice recognition software and not corrected for grammatical or spelling errors.

## 2024-10-08 ENCOUNTER — TELEPHONE (OUTPATIENT)
Dept: OBSTETRICS AND GYNECOLOGY | Facility: CLINIC | Age: 79
End: 2024-10-08
Payer: MEDICARE

## 2024-10-13 NOTE — PROGRESS NOTES
Subjective   Patient ID: Yasmine Ortiz is a 79 y.o. female who presents for UTI SX.    Seen for annual 6/10/24. Declines Urogyn at this time for prolapse. Uses Estradiol cream. Going on vacation in 2 weeks.    Symptoms started 3 months ago. Thought pads were irritating. Changed brands, came and gone. Has yellow/brown discharge. Irritation, burning, irritating. No itching. No odor. No VB.    Breast ultrasound for dense breasts, can not get MRI. Scheduled for next Monday.      Objective   Constitutional: Alert and in no acute distress. Well developed, well nourished.  Abdomen: soft nontender; no abdominal mass palpated, no organomegaly and no hernias.  Genitourinary: external genitalia: normal-very minimal erythema, no inguinal lymphadenopathy, Bartholin's urethral and [place]'s glands: normal, urethra: normal and bladder: normal on palpation.  Vagina: cuff prolapse bulging at introitus.  Cervix: absent  Uterus: absent  Right adnexa/parametria: normal.  Left adnexa/parametria: normal.  Psychiatric: alert and oriented x 3, affect normal to patient baseline and mood appropriate.     Assessment/Plan   -Recommend all cotton pads. Vulvar handout given. A&D ointment.  -urine culture.  -vaginal culture.

## 2024-10-14 ENCOUNTER — APPOINTMENT (OUTPATIENT)
Dept: OBSTETRICS AND GYNECOLOGY | Facility: CLINIC | Age: 79
End: 2024-10-14
Payer: MEDICARE

## 2024-10-14 VITALS — DIASTOLIC BLOOD PRESSURE: 90 MMHG | SYSTOLIC BLOOD PRESSURE: 142 MMHG | WEIGHT: 142.2 LBS | BODY MASS INDEX: 26.88 KG/M2

## 2024-10-14 DIAGNOSIS — N89.8 VAGINAL DISCHARGE: ICD-10-CM

## 2024-10-14 DIAGNOSIS — R39.9 UTI SYMPTOMS: ICD-10-CM

## 2024-10-14 LAB
APPEARANCE UR: CLEAR
BACTERIA #/AREA URNS AUTO: ABNORMAL /HPF
BILIRUB UR STRIP.AUTO-MCNC: NEGATIVE MG/DL
COLOR UR: ABNORMAL
GLUCOSE UR STRIP.AUTO-MCNC: NORMAL MG/DL
HYALINE CASTS #/AREA URNS AUTO: ABNORMAL /LPF
KETONES UR STRIP.AUTO-MCNC: NEGATIVE MG/DL
LEUKOCYTE ESTERASE UR QL STRIP.AUTO: ABNORMAL
NITRITE UR QL STRIP.AUTO: NEGATIVE
PH UR STRIP.AUTO: 5.5 [PH]
POC APPEARANCE, URINE: CLEAR
POC BILIRUBIN, URINE: NEGATIVE
POC BLOOD, URINE: ABNORMAL
POC COLOR, URINE: YELLOW
POC GLUCOSE, URINE: NEGATIVE MG/DL
POC KETONES, URINE: NEGATIVE MG/DL
POC LEUKOCYTES, URINE: ABNORMAL
POC NITRITE,URINE: NEGATIVE
POC PH, URINE: 6.5 PH
POC PROTEIN, URINE: NEGATIVE MG/DL
POC SPECIFIC GRAVITY, URINE: 1.01
POC UROBILINOGEN, URINE: 1 EU/DL
PROT UR STRIP.AUTO-MCNC: NEGATIVE MG/DL
RBC # UR STRIP.AUTO: NEGATIVE /UL
RBC #/AREA URNS AUTO: ABNORMAL /HPF
SP GR UR STRIP.AUTO: 1.01
SQUAMOUS #/AREA URNS AUTO: ABNORMAL /HPF
UROBILINOGEN UR STRIP.AUTO-MCNC: NORMAL MG/DL
WBC #/AREA URNS AUTO: ABNORMAL /HPF

## 2024-10-14 PROCEDURE — 87205 SMEAR GRAM STAIN: CPT

## 2024-10-14 PROCEDURE — 87086 URINE CULTURE/COLONY COUNT: CPT

## 2024-10-14 PROCEDURE — 1159F MED LIST DOCD IN RCRD: CPT | Performed by: OBSTETRICS & GYNECOLOGY

## 2024-10-14 PROCEDURE — 3077F SYST BP >= 140 MM HG: CPT | Performed by: OBSTETRICS & GYNECOLOGY

## 2024-10-14 PROCEDURE — 81003 URINALYSIS AUTO W/O SCOPE: CPT | Performed by: OBSTETRICS & GYNECOLOGY

## 2024-10-14 PROCEDURE — 1036F TOBACCO NON-USER: CPT | Performed by: OBSTETRICS & GYNECOLOGY

## 2024-10-14 PROCEDURE — 1160F RVW MEDS BY RX/DR IN RCRD: CPT | Performed by: OBSTETRICS & GYNECOLOGY

## 2024-10-14 PROCEDURE — 3080F DIAST BP >= 90 MM HG: CPT | Performed by: OBSTETRICS & GYNECOLOGY

## 2024-10-14 PROCEDURE — 81001 URINALYSIS AUTO W/SCOPE: CPT

## 2024-10-14 PROCEDURE — 99213 OFFICE O/P EST LOW 20 MIN: CPT | Performed by: OBSTETRICS & GYNECOLOGY

## 2024-10-14 PROCEDURE — 1157F ADVNC CARE PLAN IN RCRD: CPT | Performed by: OBSTETRICS & GYNECOLOGY

## 2024-10-15 LAB
BACTERIA UR CULT: NO GROWTH
HOLD SPECIMEN: NORMAL

## 2024-10-18 ENCOUNTER — TELEPHONE (OUTPATIENT)
Dept: OBSTETRICS AND GYNECOLOGY | Facility: CLINIC | Age: 79
End: 2024-10-18
Payer: MEDICARE

## 2024-10-18 LAB
CLUE CELLS VAG LPF-#/AREA: NORMAL /[LPF]
NUGENT SCORE: 3
YEAST VAG WET PREP-#/AREA: NORMAL

## 2024-10-18 NOTE — TELEPHONE ENCOUNTER
Patient called and told cultures are negative. Ok if the part touching her skin is all cotton. She just got A&D ointment and will try that.

## 2024-10-23 ENCOUNTER — APPOINTMENT (OUTPATIENT)
Dept: ORTHOPEDIC SURGERY | Facility: CLINIC | Age: 79
End: 2024-10-23
Payer: MEDICARE

## 2024-10-23 ENCOUNTER — TELEPHONE (OUTPATIENT)
Dept: PRIMARY CARE | Facility: CLINIC | Age: 79
End: 2024-10-23

## 2024-10-23 ENCOUNTER — OFFICE VISIT (OUTPATIENT)
Dept: ORTHOPEDIC SURGERY | Facility: CLINIC | Age: 79
End: 2024-10-23
Payer: MEDICARE

## 2024-10-23 DIAGNOSIS — M62.830 SPASM OF THORACIC BACK MUSCLE: ICD-10-CM

## 2024-10-23 DIAGNOSIS — M48.061 SPINAL STENOSIS OF LUMBAR REGION WITHOUT NEUROGENIC CLAUDICATION: Primary | ICD-10-CM

## 2024-10-23 DIAGNOSIS — F41.9 ANXIETY: ICD-10-CM

## 2024-10-23 PROCEDURE — 1125F AMNT PAIN NOTED PAIN PRSNT: CPT | Performed by: PHYSICIAN ASSISTANT

## 2024-10-23 PROCEDURE — 1036F TOBACCO NON-USER: CPT | Performed by: PHYSICIAN ASSISTANT

## 2024-10-23 PROCEDURE — 1159F MED LIST DOCD IN RCRD: CPT | Performed by: PHYSICIAN ASSISTANT

## 2024-10-23 PROCEDURE — 99212 OFFICE O/P EST SF 10 MIN: CPT | Performed by: PHYSICIAN ASSISTANT

## 2024-10-23 PROCEDURE — 1157F ADVNC CARE PLAN IN RCRD: CPT | Performed by: PHYSICIAN ASSISTANT

## 2024-10-23 ASSESSMENT — PAIN - FUNCTIONAL ASSESSMENT: PAIN_FUNCTIONAL_ASSESSMENT: 0-10

## 2024-10-23 ASSESSMENT — PAIN SCALES - GENERAL: PAINLEVEL_OUTOF10: 3

## 2024-10-23 NOTE — PROGRESS NOTES
Subjective   Yasmine Ortiz is a 79 y.o. female who presents for a follow-up fasting blood work.  HPI  Yasmine is a 79-year-old female with known history of hypertension hypothyroidism, dyslipidemia patient takes medication prescribed.  Recently treated for upper respiratory infection and cough, here for follow-up,    for fasting blood test. Patient is active exercises patient is in need of refills of medications, benzodiazepine, here for face-to-face follow-up.  She takes medication prescribed without side effects reported.    Review of Systems  10 system review pertinent as above  Objective     Visit Vitals  /80   Pulse 78   Temp 36.5 °C (97.7 °F)        Physical Exam  HEENT: Atraumatic normocephalic the pupils are equal and round and reactive to light the sclerae nonicteric extraocular motion are intact.  Neck: Is supple without JVD no carotid bruits the trachea is midline there are no masses pulses are equal and bilateral with normal upstroke.  Skin: Normal.  Skin good texture.  Moist.  Good turgor.  No lesions, no rashes.  Lymph: No lymphadenopathy appreciated, no masses, no lesions  Lungs: Are clear to auscultation and percussion, good breath sounds bilaterally, no rhonchi, no wheezing, good diaphragmatic excursion.  Heart: Normal rate and normal rhythm S1, S2, no S3, no gallop, murmur or rub.  Abdomen: Soft, nontender, no organomegaly, good bowel sounds.    Extremities: Full range of motion, good pulses bilateral.  No cyanosis, no clubbing or edema.  Neuro: Cranial nerves II-XII are grossly intact there is no sensory or motor deficits.  Able to move all extremities.    Assessment/Plan     Here for follow-up and face-to-face visit  Completed appropriate paper work  Urine drug screen    Herniated L4-5 disc material  Spinal surgery  Has an appointment with dr Gilmore   Pt for eval    Splenic artery aneurysm  Vascular surgery    Post nasal drip  Seasonal allergies  Add azelastine  If does not work will add  esperanza     Last colonoscopy 2017 asymptomatic  Mammogram July 2022  Bone density 2021    Heart Palpitatuion  Metoprolol tartarated 25 mg daily  Doing well  No new complaints    Hypertension  No added salt diet, do not and salt to your food  Try to exercise every other day for 30 minutes  Continue current medications  Metoprolol 50 mg daily  25 mg in addition for palpitation when needed    Sinusitis  On pseudoephedrine  Since patient wa 29 years of age  Tolerating no side effects  No palpitation     Hypothyroidism  Continue current medications  Report any changes such as weight gain or weight loss  Continue to exercise regularly  Goes to [facility] in [place]    Follows with Dr Domonique Messina  On esterase     Continue with the low-fat, low-cholesterol diet,  I recommended Mediterranean diet, which include fish, chicken, vegetables and olive oil  Exercise daily for 30 minutes at least 3 times a week  Continue home medications    Gastroesophageal reflux disease  Continue current medications  Include spicy foods do not eat late at night  Do not wear tight fitting clothes  Exercise daily    Anxiety with panic  On as needed lorazepam  Last prescription February 24, 2023  For 30 pills    6 mm Pul module non smoker  CT Chest repeat March 2024 repeat     OARRS:  Brenden Milner MD on 10/29/2024  4:29 PM  I have personally reviewed the OARRS report for Yasmine Ortiz. I have considered the risks of abuse, dependence, addiction and diversion and I believe that it is clinically appropriate for Yasmine Ortiz to be prescribed this medication    Is the patient prescribed a combination of a benzodiazepine and opioid?  No    Last Urine Drug Screen / ordered today: Yes  No results found for this or any previous visit (from the past 8760 hours).    Results are as expected.     Controlled Substance Agreement:  Date of the Last Agreement: August 4, 2023  Reviewed Controlled Substance Agreement including but not limited to the benefits,  risks, and alternatives to treatment with a Controlled Substance medication(s).    Benzodiazepines:  What is the patient's goal of therapy?  Anxiety panic  Is this being achieved with current treatment?  Yes    SANDRA-7:  No data recorded    Activities of Daily Living:   Is your overall impression that this patient is benefiting (symptom reduction outweighs side effects) from benzodiazepine therapy? Yes     1. Physical Functioning: Better  2. Family Relationship: Better  3. Social Relationship: Better  4. Mood: Better  5. Sleep Patterns: Better  6. Overall Function: Better  Problem List Items Addressed This Visit       Anxiety    Relevant Medications    LORazepam (Ativan) 0.5 mg tablet    Medication management - Primary    Relevant Orders    Drug Screen, Urine With Reflex to Confirmation     Brenden Milner MD

## 2024-10-23 NOTE — PROGRESS NOTES
Yasmine returns today for follow-up visit.    She was previously seen by Dr. Gilmore in June.  At the time she was encouraged to continue with conservative care which the patient has done.  She has worked with physical therapy, Marta for her lumbar symptoms which have slowly improved but over the last couple weeks the patient has started to notice some thoracic muscle spasms.  In physical therapy told her to reach back out to us to be seen.    The patient has had numerous imaging done between ultrasounds, CT scans and x-rays of the thoracic and lumbar spine and I do not see anything concerning by means of an alignment.  The patient has had it has not had any falls.  She is not presenting with any radicular or myelopathic symptoms so my suspicion of cord compression is very low.  Again her biggest concern is muscle spasms in the thoracic spine.    With that being said I did discuss with the patient that I would strongly encourage her to continue with conservative care by means of physical therapy and potentially see a different provider.  She has 2 different places that she would like to go.  She will keep us updated but as of now we will follow-up as needed.    I reviewed the complete 30-point review of systems that was documented on the scanned patient intake form.  All other systems are non-contributory except as defined in history of present illness.    This note was dictated using speech recognition software and was not corrected for spelling or grammatical errors

## 2024-10-29 ENCOUNTER — APPOINTMENT (OUTPATIENT)
Dept: PRIMARY CARE | Facility: CLINIC | Age: 79
End: 2024-10-29
Payer: MEDICARE

## 2024-10-29 VITALS
DIASTOLIC BLOOD PRESSURE: 80 MMHG | HEIGHT: 61 IN | HEART RATE: 78 BPM | WEIGHT: 142 LBS | SYSTOLIC BLOOD PRESSURE: 122 MMHG | TEMPERATURE: 97.7 F | BODY MASS INDEX: 26.81 KG/M2

## 2024-10-29 DIAGNOSIS — Z79.899 MEDICATION MANAGEMENT: Primary | ICD-10-CM

## 2024-10-29 DIAGNOSIS — F41.9 ANXIETY: ICD-10-CM

## 2024-10-29 PROCEDURE — 3074F SYST BP LT 130 MM HG: CPT | Performed by: INTERNAL MEDICINE

## 2024-10-29 PROCEDURE — 1159F MED LIST DOCD IN RCRD: CPT | Performed by: INTERNAL MEDICINE

## 2024-10-29 PROCEDURE — 99213 OFFICE O/P EST LOW 20 MIN: CPT | Performed by: INTERNAL MEDICINE

## 2024-10-29 PROCEDURE — 1126F AMNT PAIN NOTED NONE PRSNT: CPT | Performed by: INTERNAL MEDICINE

## 2024-10-29 PROCEDURE — 1123F ACP DISCUSS/DSCN MKR DOCD: CPT | Performed by: INTERNAL MEDICINE

## 2024-10-29 PROCEDURE — 1158F ADVNC CARE PLAN TLK DOCD: CPT | Performed by: INTERNAL MEDICINE

## 2024-10-29 PROCEDURE — 80307 DRUG TEST PRSMV CHEM ANLYZR: CPT

## 2024-10-29 PROCEDURE — 3079F DIAST BP 80-89 MM HG: CPT | Performed by: INTERNAL MEDICINE

## 2024-10-29 PROCEDURE — 1157F ADVNC CARE PLAN IN RCRD: CPT | Performed by: INTERNAL MEDICINE

## 2024-10-29 RX ORDER — LORAZEPAM 0.5 MG/1
0.5 TABLET ORAL DAILY
Qty: 90 TABLET | Refills: 1 | Status: SHIPPED | OUTPATIENT
Start: 2024-10-29

## 2024-10-29 ASSESSMENT — ENCOUNTER SYMPTOMS
LOSS OF SENSATION IN FEET: 0
DEPRESSION: 0
OCCASIONAL FEELINGS OF UNSTEADINESS: 0

## 2024-10-29 ASSESSMENT — PAIN SCALES - GENERAL: PAINLEVEL_OUTOF10: 0-NO PAIN

## 2024-10-30 LAB
AMPHETAMINES UR QL SCN: NORMAL
BARBITURATES UR QL SCN: NORMAL
BENZODIAZ UR QL SCN: NORMAL
BZE UR QL SCN: NORMAL
CANNABINOIDS UR QL SCN: NORMAL
FENTANYL+NORFENTANYL UR QL SCN: NORMAL
METHADONE UR QL SCN: NORMAL
OPIATES UR QL SCN: NORMAL
OXYCODONE+OXYMORPHONE UR QL SCN: NORMAL
PCP UR QL SCN: NORMAL

## 2024-11-25 DIAGNOSIS — I10 PRIMARY HYPERTENSION: ICD-10-CM

## 2024-11-25 RX ORDER — METOPROLOL SUCCINATE 50 MG/1
50 TABLET, EXTENDED RELEASE ORAL DAILY
Qty: 90 TABLET | Refills: 3 | Status: SHIPPED | OUTPATIENT
Start: 2024-11-25

## 2024-12-04 NOTE — PROGRESS NOTES
Subjective   Yasmine Ortiz is a 79 y.o. female who presents for a follow-up fasting blood work. And medicare wellness  HPI  Yasmine is a 79-year-old female with known history of hypertension hypothyroidism, dyslipidemia patient takes medication prescribed.  Recently treated for upper respiratory infection and cough, here for follow-up,  for fasting blood test. Patient is active exercises patient is in need of refills of medications, benzodiazepine, here for face-to-face follow-up.  She takes medication prescribed without side effects reported. Reviewed all meds with patient, now on probiotic J[place] 5 billion for IBS.     Review of Systems  10 system review pertinent as above  Objective     Visit Vitals  /84   Pulse 74   Resp 15          Physical Exam  HEENT: Atraumatic normocephalic the pupils are equal and round and reactive to light the sclerae nonicteric extraocular motion are intact.  Neck: Is supple without JVD no carotid bruits the trachea is midline there are no masses pulses are equal and bilateral with normal upstroke.  Skin: Normal.  Skin good texture.  Moist.  Good turgor.  No lesions, no rashes.  Lymph: No lymphadenopathy appreciated, no masses, no lesions  Lungs: Are clear to auscultation and percussion, good breath sounds bilaterally, no rhonchi, no wheezing, good diaphragmatic excursion.  Heart: Normal rate and normal rhythm S1, S2, no S3, no gallop, murmur or rub.  Abdomen: Soft, nontender, no organomegaly, good bowel sounds.    Extremities: Full range of motion, good pulses bilateral.  No cyanosis, no clubbing or edema.  Neuro: Cranial nerves II-XII are grossly intact there is no sensory or motor deficits.  Able to move all extremities.    Assessment/Plan     Fasting Blood work    CBC BMP LIPID AST ALT Vit D TSH    Immunization Up to date    RSV 10/23/2024  Covid 09/23/2024  HD Flu [facility]    Prevention    Colonoscopy aged out no current issues    DEXA Dr Cervantes GYN  Mamm breast US GYN       Here for  follow-up and face-to-face visit  Completed appropriate paper work  Urine drug screen    Herniated L4-5 disc material  Spinal surgery  Has an appointment with dr Gilmore   Pt for eval    Splenic artery aneurysm  Vascular surgery    Post nasal drip  Seasonal allergies  Add azelastine  If does not work will add sigulair     Last colonoscopy 2017 asymptomatic  Mammogram July 2022  Bone density 2021    Heart Palpitatuion  Metoprolol tartarated 25 mg daily  Doing well  No new complaints    Hypertension  No added salt diet, do not and salt to your food  Try to exercise every other day for 30 minutes  Continue current medications  Metoprolol 50 mg daily  25 mg in addition for palpitation when needed    Sinusitis  On pseudoephedrine  Since patient wa 29 years of age  Tolerating no side effects  No palpitation     Hypothyroidism  Continue current medications  Report any changes such as weight gain or weight loss  Continue to exercise regularly  Goes to [facility] in [place]    Follows with Dr Domonique Messina  On esterase     Continue with the low-fat, low-cholesterol diet,  I recommended Mediterranean diet, which include fish, chicken, vegetables and olive oil  Exercise daily for 30 minutes at least 3 times a week  Continue home medications    Gastroesophageal reflux disease  Continue current medications  Include spicy foods do not eat late at night  Do not wear tight fitting clothes  Exercise daily    Anxiety with panic  On as needed lorazepam  Last prescription February 24, 2023  For 30 pills    6 mm Pul module non smoker  CT Chest repeat March 2024 repeat     OARRS:  No data recorded  I have personally reviewed the OARRS report for Yasmine Ortiz. I have considered the risks of abuse, dependence, addiction and diversion and I believe that it is clinically appropriate for Yasmine Ortiz to be prescribed this medication    Is the patient prescribed a combination of a benzodiazepine and opioid?  No    Last Urine Drug Screen /  ordered today: Yes  Recent Results (from the past 8760 hours)   Drug Screen, Urine With Reflex to Confirmation    Collection Time: 10/29/24  4:32 PM   Result Value Ref Range    Amphetamine Screen, Urine Presumptive Negative Presumptive Negative    Barbiturate Screen, Urine Presumptive Negative Presumptive Negative    Benzodiazepines Screen, Urine Presumptive Negative Presumptive Negative    Cannabinoid Screen, Urine Presumptive Negative Presumptive Negative    Cocaine Metabolite Screen, Urine Presumptive Negative Presumptive Negative    Fentanyl Screen, Urine Presumptive Negative Presumptive Negative    Opiate Screen, Urine Presumptive Negative Presumptive Negative    Oxycodone Screen, Urine Presumptive Negative Presumptive Negative    PCP Screen, Urine Presumptive Negative Presumptive Negative    Methadone Screen, Urine Presumptive Negative Presumptive Negative       Results are as expected.     Controlled Substance Agreement:  Date of the Last Agreement: August 4, 2023  Reviewed Controlled Substance Agreement including but not limited to the benefits, risks, and alternatives to treatment with a Controlled Substance medication(s).    Benzodiazepines:  What is the patient's goal of therapy?  Anxiety panic  Is this being achieved with current treatment?  Yes    SANDRA-7:  No data recorded    Activities of Daily Living:   Is your overall impression that this patient is benefiting (symptom reduction outweighs side effects) from benzodiazepine therapy? Yes     1. Physical Functioning: Better  2. Family Relationship: Better  3. Social Relationship: Better  4. Mood: Better  5. Sleep Patterns: Better  6. Overall Function: Better  Problem List Items Addressed This Visit       Hypothyroidism    Relevant Medications    levothyroxine (Synthroid, Levoxyl) 112 mcg tablet     Other Visit Diagnoses       Hyperlipidemia, unspecified hyperlipidemia type        Relevant Medications    pravastatin (Pravachol) 80 mg tablet             Brenden Milner MD

## 2024-12-11 ENCOUNTER — APPOINTMENT (OUTPATIENT)
Dept: PRIMARY CARE | Facility: CLINIC | Age: 79
End: 2024-12-11
Payer: MEDICARE

## 2024-12-11 ENCOUNTER — TELEPHONE (OUTPATIENT)
Dept: PRIMARY CARE | Facility: CLINIC | Age: 79
End: 2024-12-11

## 2024-12-11 VITALS
WEIGHT: 135 LBS | BODY MASS INDEX: 25.49 KG/M2 | SYSTOLIC BLOOD PRESSURE: 134 MMHG | DIASTOLIC BLOOD PRESSURE: 84 MMHG | HEIGHT: 61 IN | RESPIRATION RATE: 15 BRPM | HEART RATE: 74 BPM

## 2024-12-11 DIAGNOSIS — K21.9 GASTROESOPHAGEAL REFLUX DISEASE WITHOUT ESOPHAGITIS: ICD-10-CM

## 2024-12-11 DIAGNOSIS — Z00.00 ROUTINE GENERAL MEDICAL EXAMINATION AT HEALTH CARE FACILITY: ICD-10-CM

## 2024-12-11 DIAGNOSIS — E78.5 DYSLIPIDEMIA: Primary | ICD-10-CM

## 2024-12-11 DIAGNOSIS — E03.9 HYPOTHYROIDISM, UNSPECIFIED TYPE: ICD-10-CM

## 2024-12-11 DIAGNOSIS — I47.10 SVT (SUPRAVENTRICULAR TACHYCARDIA) (CMS-HCC): ICD-10-CM

## 2024-12-11 DIAGNOSIS — F41.9 ANXIETY: ICD-10-CM

## 2024-12-11 DIAGNOSIS — E55.9 VITAMIN D INSUFFICIENCY: ICD-10-CM

## 2024-12-11 DIAGNOSIS — E78.5 HYPERLIPIDEMIA, UNSPECIFIED HYPERLIPIDEMIA TYPE: ICD-10-CM

## 2024-12-11 DIAGNOSIS — I10 PRIMARY HYPERTENSION: ICD-10-CM

## 2024-12-11 LAB
25(OH)D3 SERPL-MCNC: 39 NG/ML (ref 30–100)
ALT SERPL W P-5'-P-CCNC: 25 U/L (ref 16–63)
ANION GAP SERPL CALC-SCNC: 15 MMOL/L (ref 10–20)
AST SERPL W P-5'-P-CCNC: 24 U/L (ref 15–37)
BASOPHILS # BLD AUTO: 0.03 X10*3/UL (ref 0.1–1.6)
BASOPHILS NFR BLD AUTO: 0.64 % (ref 0–0.3)
BUN SERPL-MCNC: 17 MG/DL (ref 7–18)
CALCIUM SERPL-MCNC: 8.8 MG/DL (ref 8.5–10.1)
CHLORIDE SERPL-SCNC: 98 MMOL/L (ref 98–107)
CHOLEST SERPL-MCNC: 183 MG/DL (ref 0–199)
CHOLESTEROL/HDL RATIO: 2.5 (ref 4.2–7)
CO2 SERPL-SCNC: 24 MMOL/L (ref 21–32)
CREAT SERPL-MCNC: 0.84 MG/DL (ref 0.6–1.1)
EGFRCR SERPLBLD CKD-EPI 2021: 70 ML/MIN/1.73M*2
EOSINOPHIL # BLD AUTO: 0.15 X10*3/UL (ref 0.04–0.5)
EOSINOPHIL NFR BLD AUTO: 3.12 % (ref 0.7–7)
ERYTHROCYTE [DISTWIDTH] IN BLOOD BY AUTOMATED COUNT: 14.1 % (ref 11.5–14.5)
GLUCOSE SERPL-MCNC: 109 MG/DL (ref 74–100)
HCT VFR BLD AUTO: 37 % (ref 36.6–46.6)
HDLC SERPL-MCNC: 72 MG/DL (ref 40–59)
HGB BLD-MCNC: 12.76 G/DL (ref 12–15.4)
IS PATIENT FASTING: YES
LDLC SERPL DIRECT ASSAY-MCNC: 93 MG/DL (ref 0–100)
LYMPHOCYTES # BLD AUTO: 1.08 X10*3/UL (ref 0–6)
LYMPHOCYTES NFR BLD AUTO: 23.2 % (ref 20.5–51.1)
MCH RBC QN AUTO: 30.4 PG (ref 26–32)
MCHC RBC AUTO-ENTMCNC: 34.5 G/DL (ref 31–38)
MCV RBC AUTO: 88 FL (ref 80–96)
MONOCYTES # BLD AUTO: 0.45 X10*3/UL (ref 1.6–24.9)
MONOCYTES NFR BLD AUTO: 9.67 % (ref 1.7–9.3)
NEUTROPHILS # BLD AUTO: 2.95 X10*3/UL (ref 1.4–6.5)
NEUTROPHILS NFR BLD AUTO: 63.37 % (ref 42.2–75.2)
PLATELET # BLD AUTO: 252.9 X10*3/UL (ref 150–450)
PMV BLD AUTO: 8.92 FL (ref 7.8–11)
POTASSIUM SERPL-SCNC: 4.9 MMOL/L (ref 3.5–5.1)
RBC # BLD AUTO: 4.2 X10*6/UL (ref 3.9–5.3)
SODIUM SERPL-SCNC: 132 MMOL/L (ref 136–145)
TRIGL SERPL-MCNC: 67 MG/DL
TSH SERPL-ACNC: 0.75 MIU/L (ref 0.44–3.98)
WBC # BLD AUTO: 4.65 X10*3/UL (ref 4.5–10.5)

## 2024-12-11 PROCEDURE — 1126F AMNT PAIN NOTED NONE PRSNT: CPT | Performed by: INTERNAL MEDICINE

## 2024-12-11 PROCEDURE — 3079F DIAST BP 80-89 MM HG: CPT | Performed by: INTERNAL MEDICINE

## 2024-12-11 PROCEDURE — 99214 OFFICE O/P EST MOD 30 MIN: CPT | Performed by: INTERNAL MEDICINE

## 2024-12-11 PROCEDURE — 3075F SYST BP GE 130 - 139MM HG: CPT | Performed by: INTERNAL MEDICINE

## 2024-12-11 PROCEDURE — G0442 ANNUAL ALCOHOL SCREEN 15 MIN: HCPCS | Performed by: INTERNAL MEDICINE

## 2024-12-11 PROCEDURE — 1170F FXNL STATUS ASSESSED: CPT | Performed by: INTERNAL MEDICINE

## 2024-12-11 PROCEDURE — 1123F ACP DISCUSS/DSCN MKR DOCD: CPT | Performed by: INTERNAL MEDICINE

## 2024-12-11 PROCEDURE — 80048 BASIC METABOLIC PNL TOTAL CA: CPT | Performed by: INTERNAL MEDICINE

## 2024-12-11 PROCEDURE — 85025 COMPLETE CBC W/AUTO DIFF WBC: CPT | Performed by: INTERNAL MEDICINE

## 2024-12-11 PROCEDURE — 84450 TRANSFERASE (AST) (SGOT): CPT | Performed by: INTERNAL MEDICINE

## 2024-12-11 PROCEDURE — 84443 ASSAY THYROID STIM HORMONE: CPT | Performed by: INTERNAL MEDICINE

## 2024-12-11 PROCEDURE — G2211 COMPLEX E/M VISIT ADD ON: HCPCS | Performed by: INTERNAL MEDICINE

## 2024-12-11 PROCEDURE — 1159F MED LIST DOCD IN RCRD: CPT | Performed by: INTERNAL MEDICINE

## 2024-12-11 PROCEDURE — 1157F ADVNC CARE PLAN IN RCRD: CPT | Performed by: INTERNAL MEDICINE

## 2024-12-11 PROCEDURE — 84460 ALANINE AMINO (ALT) (SGPT): CPT | Performed by: INTERNAL MEDICINE

## 2024-12-11 PROCEDURE — 80061 LIPID PANEL: CPT | Performed by: INTERNAL MEDICINE

## 2024-12-11 PROCEDURE — 1160F RVW MEDS BY RX/DR IN RCRD: CPT | Performed by: INTERNAL MEDICINE

## 2024-12-11 PROCEDURE — G0439 PPPS, SUBSEQ VISIT: HCPCS | Performed by: INTERNAL MEDICINE

## 2024-12-11 PROCEDURE — 82306 VITAMIN D 25 HYDROXY: CPT | Performed by: INTERNAL MEDICINE

## 2024-12-11 PROCEDURE — 1158F ADVNC CARE PLAN TLK DOCD: CPT | Performed by: INTERNAL MEDICINE

## 2024-12-11 PROCEDURE — 99497 ADVNCD CARE PLAN 30 MIN: CPT | Performed by: INTERNAL MEDICINE

## 2024-12-11 RX ORDER — LEVOTHYROXINE SODIUM 112 UG/1
112 TABLET ORAL
Qty: 90 TABLET | Refills: 3 | Status: SHIPPED | OUTPATIENT
Start: 2024-12-11 | End: 2025-12-11

## 2024-12-11 RX ORDER — PRAVASTATIN SODIUM 80 MG/1
80 TABLET ORAL NIGHTLY
Qty: 90 TABLET | Refills: 3 | Status: SHIPPED | OUTPATIENT
Start: 2024-12-11

## 2024-12-11 ASSESSMENT — ACTIVITIES OF DAILY LIVING (ADL)
PATIENT'S MEMORY ADEQUATE TO SAFELY COMPLETE DAILY ACTIVITIES?: YES
PREPARING MEALS: INDEPENDENT
USING TELEPHONE: INDEPENDENT
MANAGING FINANCES: INDEPENDENT
TAKING MEDICATION: INDEPENDENT
HEARING - RIGHT EAR: FUNCTIONAL
STIL DRIVING: YES
HEARING - LEFT EAR: FUNCTIONAL
FEEDING YOURSELF: INDEPENDENT
USING TRANSPORTATION: INDEPENDENT
JUDGMENT_ADEQUATE_SAFELY_COMPLETE_DAILY_ACTIVITIES: YES
DOING HOUSEWORK: INDEPENDENT
GROCERY SHOPPING: INDEPENDENT
BATHING: INDEPENDENT
GROOMING: INDEPENDENT
WALKS IN HOME: INDEPENDENT
ADEQUATE_TO_COMPLETE_ADL: YES
EATING: INDEPENDENT
DRESSING YOURSELF: INDEPENDENT
PILL BOX USED: NO
NEEDS ASSISTANCE WITH FOOD: INDEPENDENT
TOILETING: INDEPENDENT

## 2024-12-11 ASSESSMENT — COLUMBIA-SUICIDE SEVERITY RATING SCALE - C-SSRS
6. HAVE YOU EVER DONE ANYTHING, STARTED TO DO ANYTHING, OR PREPARED TO DO ANYTHING TO END YOUR LIFE?: NO
2. HAVE YOU ACTUALLY HAD ANY THOUGHTS OF KILLING YOURSELF?: NO
1. IN THE PAST MONTH, HAVE YOU WISHED YOU WERE DEAD OR WISHED YOU COULD GO TO SLEEP AND NOT WAKE UP?: NO

## 2024-12-11 ASSESSMENT — ANXIETY QUESTIONNAIRES
GAD7 TOTAL SCORE: 2
6. BECOMING EASILY ANNOYED OR IRRITABLE: SEVERAL DAYS
4. TROUBLE RELAXING: SEVERAL DAYS
1. FEELING NERVOUS, ANXIOUS, OR ON EDGE: NOT AT ALL
3. WORRYING TOO MUCH ABOUT DIFFERENT THINGS: NOT AT ALL
2. NOT BEING ABLE TO STOP OR CONTROL WORRYING: NOT AT ALL
IF YOU CHECKED OFF ANY PROBLEMS ON THIS QUESTIONNAIRE, HOW DIFFICULT HAVE THESE PROBLEMS MADE IT FOR YOU TO DO YOUR WORK, TAKE CARE OF THINGS AT HOME, OR GET ALONG WITH OTHER PEOPLE: NOT DIFFICULT AT ALL
7. FEELING AFRAID AS IF SOMETHING AWFUL MIGHT HAPPEN: NOT AT ALL
5. BEING SO RESTLESS THAT IT IS HARD TO SIT STILL: NOT AT ALL

## 2024-12-11 ASSESSMENT — PAIN SCALES - GENERAL: PAINLEVEL_OUTOF10: 0-NO PAIN

## 2024-12-11 ASSESSMENT — GERIATRIC MINI NUTRITIONAL ASSESSMENT (MNA)
B WEIGHT LOSS DURING THE LAST 3 MONTHS: NO WEIGHT LOSS
A HAS FOOD INTAKE DECLINED OVER THE PAST 3 MONTHS DUE TO LOSS OF APPETITE, DIGESTIVE PROBLEMS, CHEWING OR SWALLOWING DIFFICULTIES?: NO DECREASE IN FOOD INTAKE
E NEUROPSYCHOLOGICAL PROBLEMS: NO PSYCHOLOGICAL PROBLEMS
D HAS SUFFERED PSYCHOLOGICAL STRESS OR ACUTE DISEASE IN THE PAST 3 MONTHS?: NO
C GENERAL MOBILITY: GOES OUT

## 2024-12-11 ASSESSMENT — ENCOUNTER SYMPTOMS
LOSS OF SENSATION IN FEET: 0
OCCASIONAL FEELINGS OF UNSTEADINESS: 0
DEPRESSION: 0

## 2024-12-11 NOTE — ASSESSMENT & PLAN NOTE
Orders:    pravastatin (Pravachol) 80 mg tablet; Take 1 tablet (80 mg) by mouth once daily at bedtime.    Basic Metabolic Panel

## 2024-12-11 NOTE — ASSESSMENT & PLAN NOTE
Orders:    levothyroxine (Synthroid, Levoxyl) 112 mcg tablet; Take 1 tablet (112 mcg) by mouth once daily in the morning. Take before meals.    TSH

## 2024-12-11 NOTE — PROGRESS NOTES
"Subjective   Reason for Visit: Yasmine Ortiz is an 79 y.o. female here for a Medicare Wellness visit.   Reviewed all medications by prescribing practitioner or clinical pharmacist (such as prescriptions, OTCs, herbal therapies and supplements) and documented in the medical record.    HPI  Yasmine is 79, she is here for Medicare wellness she denies chest pain shortness of breath fever chills nausea vomiting constipation diarrhea dysuria urgency frequency she takes medication as prescribed without side effects to report.  Patient Care Team:  Brenden Milner MD as PCP - General (Internal Medicine)  Noel Mercado MD as Consulting Physician (Orthopaedic Surgery)     Review of Systems  Systems reviewed pertinent as above  Objective   Vitals:  /84   Pulse 74   Resp 15   Ht 1.549 m (5' 1\")   Wt 61.2 kg (135 lb)   BMI 25.51 kg/m²       Physical Exam  HEENT: Atraumatic normocephalic the pupils are equal and round and reactive to light the sclerae nonicteric extraocular motion are intact.  Neck: Is supple without JVD no carotid bruits the trachea is midline there are no masses pulses are equal and bilateral with normal upstroke.  Skin: Normal.  Skin good texture.  Moist.  Good turgor.  No lesions, no rashes.  Lymph: No lymphadenopathy appreciated, no masses, no lesions  Lungs: Are clear to auscultation and percussion, good breath sounds bilaterally, no rhonchi, no wheezing, good diaphragmatic excursion.  Heart: Normal rate and normal rhythm S1, S2, no S3, no gallop, murmur or rub.  Abdomen: Soft, nontender, no organomegaly, good bowel sounds.    Extremities: Full range of motion, good pulses bilateral.  No cyanosis, no clubbing or edema.  Neuro: Cranial nerves II-XII are grossly intact there is no sensory or motor deficits.  Able to move all extremities.    Assessment & Plan  Hypothyroidism, unspecified type    Orders:    levothyroxine (Synthroid, Levoxyl) 112 mcg tablet; Take 1 tablet (112 mcg) by mouth once daily " in the morning. Take before meals.    TSH    Hyperlipidemia, unspecified hyperlipidemia type    Orders:    pravastatin (Pravachol) 80 mg tablet; Take 1 tablet (80 mg) by mouth once daily at bedtime.    Basic Metabolic Panel    Dyslipidemia    Orders:    Lipid panel    AST    ALT    Primary hypertension         SVT (supraventricular tachycardia) (CMS-HCC)         Gastroesophageal reflux disease without esophagitis    Orders:    CBC w/5 Part Differential, [facility]    Anxiety         Medicare wellness

## 2025-01-24 ENCOUNTER — OFFICE VISIT (OUTPATIENT)
Dept: ORTHOPEDIC SURGERY | Facility: HOSPITAL | Age: 80
End: 2025-01-24
Payer: MEDICARE

## 2025-01-24 DIAGNOSIS — M19.041 PRIMARY OSTEOARTHRITIS OF HANDS, BILATERAL: Primary | ICD-10-CM

## 2025-01-24 DIAGNOSIS — M19.042 PRIMARY OSTEOARTHRITIS OF HANDS, BILATERAL: Primary | ICD-10-CM

## 2025-01-24 PROCEDURE — 1125F AMNT PAIN NOTED PAIN PRSNT: CPT | Performed by: ORTHOPAEDIC SURGERY

## 2025-01-24 PROCEDURE — 1036F TOBACCO NON-USER: CPT | Performed by: ORTHOPAEDIC SURGERY

## 2025-01-24 PROCEDURE — 1160F RVW MEDS BY RX/DR IN RCRD: CPT | Performed by: ORTHOPAEDIC SURGERY

## 2025-01-24 PROCEDURE — 1157F ADVNC CARE PLAN IN RCRD: CPT | Performed by: ORTHOPAEDIC SURGERY

## 2025-01-24 PROCEDURE — 99203 OFFICE O/P NEW LOW 30 MIN: CPT | Performed by: ORTHOPAEDIC SURGERY

## 2025-01-24 PROCEDURE — 1159F MED LIST DOCD IN RCRD: CPT | Performed by: ORTHOPAEDIC SURGERY

## 2025-01-24 PROCEDURE — 99213 OFFICE O/P EST LOW 20 MIN: CPT | Performed by: ORTHOPAEDIC SURGERY

## 2025-01-24 ASSESSMENT — ENCOUNTER SYMPTOMS
ARTHRALGIAS: 1
CHILLS: 0
WHEEZING: 0
FATIGUE: 0
SORE THROAT: 0
SHORTNESS OF BREATH: 0
RHINORRHEA: 0
FEVER: 0
WOUND: 0
JOINT SWELLING: 1

## 2025-01-24 ASSESSMENT — PAIN SCALES - GENERAL: PAINLEVEL_OUTOF10: 2

## 2025-01-24 ASSESSMENT — PAIN - FUNCTIONAL ASSESSMENT: PAIN_FUNCTIONAL_ASSESSMENT: 0-10

## 2025-01-24 NOTE — PROGRESS NOTES
Reason for Appointment  Chief Complaint   Patient presents with    Right Middle Finger - Pain    Left Middle Finger - Pain     History of Present Illness  New patient is a 79 y.o. female here today for evaluation of bilateral hand pain and stiffness.  She has severe bilateral hand arthritis especially at the distal joints.  She has had a right small finger DIP fusion in the past and has done well and she also has had a right shoulder placement done in 2016 and has also done well.  She has pain mostly at the left long finger DIP joint with pinching and gripping.  Finger still move well, she has been checked for inflammatory arthritis in the past.  No numbness or tingling in the hands.    Past Medical History:   Diagnosis Date    Abnormal finding of blood chemistry, unspecified 02/21/2014    Abnormal blood chemistry    History of falling     History of fall    Other specified noninflammatory disorders of vagina 06/19/2014    Vaginal dryness    Pain in unspecified wrist 03/05/2014    Pain, wrist joint    Personal history of other diseases of the circulatory system     History of hypertension    Personal history of other diseases of the musculoskeletal system and connective tissue     Personal history of arthritis    Personal history of other diseases of the respiratory system 04/25/2014    History of sinusitis    Personal history of other endocrine, nutritional and metabolic disease     History of hypercholesterolemia    Personal history of other specified conditions 06/05/2013    History of insomnia    Primary osteoarthritis, unspecified wrist 05/14/2014    Osteoarthritis of wrist    Pure hypercholesterolemia, unspecified 06/09/2013    Low-density-lipoid-type (LDL) hyperlipoproteinemia       Past Surgical History:   Procedure Laterality Date    BREAST BIOPSY  01/07/2014    Biopsy Breast Open    CATARACT EXTRACTION  01/07/2014    Cataract Surgery    MOUTH SURGERY  10/23/2014    Oral Surgery Tooth Extraction    TOTAL  ABDOMINAL HYSTERECTOMY W/ BILATERAL SALPINGOOPHORECTOMY  01/07/2014    Total Abdominal Hysterectomy With Removal Of Both Ovaries    TOTAL SHOULDER ARTHROPLASTY  07/12/2018    Shoulder Arthroplasty Total Shoulder Replacement       Medication Documentation Review Audit       Reviewed by Yasmeen Tyler MA (Medical Assistant) on 01/24/25 at 1011      Medication Order Taking? Sig Documenting Provider Last Dose Status   cholecalciferol (Vitamin D-3) 25 MCG (1000 UT) capsule [id number] Yes Take 1 capsule (25 mcg) by mouth once daily. Historical Provider, MD  Active   diclofenac sodium (Voltaren) 1 % gel gel [id number] Yes PLEASE SEE ATTACHED FOR DETAILED DIRECTIONS Historical Provider, MD  Active   estradiol (Estrace) 0.01 % (0.1 mg/gram) vaginal cream [id number] Yes Insert 0.125 Applicatorfuls (0.5 g) into the vagina 2 times a week. Domonique Messina MD  Active   levothyroxine (Synthroid, Levoxyl) 112 mcg tablet [id number] Yes Take 1 tablet (112 mcg) by mouth once daily in the morning. Take before meals. Brenden Milner MD  Active   LORazepam (Ativan) 0.5 mg tablet [id number] Yes Take 1 tablet (0.5 mg) by mouth once daily. Brenden Milner MD  Active   metoprolol succinate XL (Toprol-XL) 50 mg 24 hr tablet [id number] Yes TAKE 1 TABLET DAILY Brenden Milner MD  Active   metoprolol tartrate (Lopressor) 25 mg tablet [id number] Yes Take 1 tablet (25 mg) by mouth once daily. Prn palpitations Brenden Milner MD  Active   multivitamin tablet [id number] Yes Take 1 tablet by mouth once daily. Historical Provider, MD  Active   pravastatin (Pravachol) 80 mg tablet [id number] Yes Take 1 tablet (80 mg) by mouth once daily at bedtime. Brenden Milner MD  Active   pseudoephedrine-guaifenesin (Mucinex D)  mg 12 hr tablet [id number] Yes Take 1 tablet by mouth once daily as needed for allergies. Do not crush, chew, or split. Historical Provider, MD  Active                    Allergies   Allergen Reactions    Tree Nut Other and Swelling     Had some tingling on  side of tongue    Hydrocod-Pheniramine-Pe-Ppa-Gg Headache    Hydrocodone Other     Headache    Lactase Diarrhea    Nut - Unspecified Swelling    Sulfa (Sulfonamide Antibiotics) Rash       Review of Systems   Constitutional:  Negative for chills, fatigue and fever.   HENT:  Negative for mouth sores, rhinorrhea and sore throat.    Respiratory:  Negative for shortness of breath and wheezing.    Cardiovascular:  Negative for chest pain and leg swelling.   Musculoskeletal:  Positive for arthralgias and joint swelling.   Skin:  Negative for rash and wound.     Exam   On exam patient is alert, awake, and in no acute distress.  Head is normocephalic, no JVD, no auditory wheezes.  She has good shoulder and elbow motion, severe DJD in the hands especially in the distal digits.  Some early nail deformities of multiple fingers.  Fairly good motion of the digits with some global swelling.  Skin is thin, warm and dry, without ulcerations, no other swelling or lymphadenopathy.  Good pulses and sensation in the upper extremities    Assessment   Bilateral hand osteoarthritis    Plan   We discussed conservative treatment options today with topical Voltaren gel and arthritis gloves for compression at night.  She has had a previous right small finger DIP joint fusion and we did discuss DIP fusions in the future if she continues to have pain and deformity but she is not interested in this today.  She still has good motion in the hands for the severity of her arthritis.  We would be happy to see her back at any point for any further issues.    I, Radha Londono PA-C, am acting as a scribe and attest that this documentation has been prepared under the direction and in the presence of Syed Berg MD.  By signing below, ISyed MD, personally performed the services described in this documentation. All medical record entries made by the scribe were at my direction and in my presence. I have reviewed the chart and agree that the  record reflects my personal performance and is accurate and complete.

## 2025-01-28 DIAGNOSIS — L30.9 DERMATITIS: ICD-10-CM

## 2025-01-31 RX ORDER — MUPIROCIN 20 MG/G
OINTMENT TOPICAL
Qty: 30 G | Refills: 35 | Status: SHIPPED | OUTPATIENT
Start: 2025-01-31

## 2025-03-10 ENCOUNTER — TELEMEDICINE (OUTPATIENT)
Dept: PRIMARY CARE | Facility: CLINIC | Age: 80
End: 2025-03-10
Payer: MEDICARE

## 2025-03-10 DIAGNOSIS — R09.82 POSTNASAL DRIP: ICD-10-CM

## 2025-03-10 DIAGNOSIS — R09.81 SINUS CONGESTION: ICD-10-CM

## 2025-03-10 DIAGNOSIS — J01.10 ACUTE NON-RECURRENT FRONTAL SINUSITIS: Primary | ICD-10-CM

## 2025-03-10 PROCEDURE — 99213 OFFICE O/P EST LOW 20 MIN: CPT | Performed by: INTERNAL MEDICINE

## 2025-03-10 PROCEDURE — 1157F ADVNC CARE PLAN IN RCRD: CPT | Performed by: INTERNAL MEDICINE

## 2025-03-10 RX ORDER — AMOXICILLIN AND CLAVULANATE POTASSIUM 500; 125 MG/1; MG/1
500 TABLET, FILM COATED ORAL 3 TIMES DAILY
Qty: 30 TABLET | Refills: 0 | Status: SHIPPED | OUTPATIENT
Start: 2025-03-10 | End: 2025-03-20

## 2025-03-10 NOTE — PROGRESS NOTES
Subjective   Yasmine Ortiz is a 79 y.o. female who presents for a follow-up visit sinus congestion  HPI  Yasmine is a 79-year-old female with known history of hypertension hypothyroidism, dyslipidemia patient takes medication prescribed.  Recently treated for upper respiratory infection and cough, here for a virtual visit complaining of sinus congestion with postnasal drip having difficulty bringing any phlegm up some ear congestion as well primarily right ear there is no fever chills nausea vomiting constipation diarrhea dysuria urgency frequency.    Review of Systems  10 system review pertinent as above  Objective   Virtual visit  There were no vitals taken for this visit.         Physical Exam  Virtual visit  Assessment/Plan   Virtual visit    Acute sinus congestion  I suspect acute bacterial infection  Start the patient on Augmentin 500 g 3 times a day  Fluids rest  Call if not better    Postnasal drip  With acute sinus congestion  Continue with antibiotic therapy as above  Call if not improved      Fasting Blood work    CBC BMP LIPID AST ALT Vit D TSH    Immunization Up to date    RSV 10/23/2024  Covid 09/23/2024  HD Flu [facility]    Prevention    Colonoscopy aged out no current issues    DEXA Dr Cervantes GYN  Mamm breast US GYN       Here for follow-up and face-to-face visit  Completed appropriate paper work  Urine drug screen    Herniated L4-5 disc material  Spinal surgery  Has an appointment with dr Gilmore   Pt for eval    Splenic artery aneurysm  Vascular surgery    Post nasal drip  Seasonal allergies  Add azelastine  If does not work will add sigulair     Last colonoscopy 2017 asymptomatic  Mammogram July 2022  Bone density 2021    Heart Palpitatuion  Metoprolol tartarated 25 mg daily  Doing well  No new complaints    Hypertension  No added salt diet, do not and salt to your food  Try to exercise every other day for 30 minutes  Continue current medications  Metoprolol 50 mg daily  25 mg in addition for palpitation  when needed    Sinusitis  On pseudoephedrine  Since patient wa 29 years of age  Tolerating no side effects  No palpitation     Hypothyroidism  Continue current medications  Report any changes such as weight gain or weight loss  Continue to exercise regularly  Goes to [facility] in [place]    Follows with Dr Domonique Messina  On esterase     Continue with the low-fat, low-cholesterol diet,  I recommended Mediterranean diet, which include fish, chicken, vegetables and olive oil  Exercise daily for 30 minutes at least 3 times a week  Continue home medications    Gastroesophageal reflux disease  Continue current medications  Include spicy foods do not eat late at night  Do not wear tight fitting clothes  Exercise daily    Anxiety with panic  On as needed lorazepam  Last prescription February 24, 2023  For 30 pills    6 mm Pul module non smoker  CT Chest repeat March 2024 repeat     OARRS:  No data recorded  I have personally reviewed the OARRS report for Yasmine Ortiz. I have considered the risks of abuse, dependence, addiction and diversion and I believe that it is clinically appropriate for Yasmine Ortiz to be prescribed this medication    Is the patient prescribed a combination of a benzodiazepine and opioid?  No    Last Urine Drug Screen / ordered today: Yes  Recent Results (from the past 8760 hours)   Drug Screen, Urine With Reflex to Confirmation    Collection Time: 10/29/24  4:32 PM   Result Value Ref Range    Amphetamine Screen, Urine Presumptive Negative Presumptive Negative    Barbiturate Screen, Urine Presumptive Negative Presumptive Negative    Benzodiazepines Screen, Urine Presumptive Negative Presumptive Negative    Cannabinoid Screen, Urine Presumptive Negative Presumptive Negative    Cocaine Metabolite Screen, Urine Presumptive Negative Presumptive Negative    Fentanyl Screen, Urine Presumptive Negative Presumptive Negative    Opiate Screen, Urine Presumptive Negative Presumptive Negative    Oxycodone  Screen, Urine Presumptive Negative Presumptive Negative    PCP Screen, Urine Presumptive Negative Presumptive Negative    Methadone Screen, Urine Presumptive Negative Presumptive Negative       Results are as expected.     Controlled Substance Agreement:  Date of the Last Agreement: August 4, 2023  Reviewed Controlled Substance Agreement including but not limited to the benefits, risks, and alternatives to treatment with a Controlled Substance medication(s).    Benzodiazepines:  What is the patient's goal of therapy?  Anxiety panic  Is this being achieved with current treatment?  Yes    SANDRA-7:  No data recorded    Activities of Daily Living:   Is your overall impression that this patient is benefiting (symptom reduction outweighs side effects) from benzodiazepine therapy? Yes     1. Physical Functioning: Better  2. Family Relationship: Better  3. Social Relationship: Better  4. Mood: Better  5. Sleep Patterns: Better  6. Overall Function: Better  Problem List Items Addressed This Visit    None  Visit Diagnoses       Acute non-recurrent frontal sinusitis    -  Primary    Relevant Medications    amoxicillin-pot clavulanate (Augmentin) 500-125 mg tablet            Brenden Milner MD

## 2025-04-03 NOTE — PROGRESS NOTES
Subjective   Yasmine Ortiz is a 79 y.o. female who presents for a follow-up neck pain.  HPI  Yasmine is a 79-year-old female with known history of hypertension hypothyroidism, dyslipidemia patient takes medication prescribed.  Neck pain started 1 month ago,  Rt neck to mid trapezius.  By now neck has improved.   Review of Systems  10 system review pertinent as above  Objective     Visit Vitals  Temp 36.3 °C (97.4 °F) (Temporal)          Physical Exam  HEENT: Atraumatic normocephalic the pupils are equal and round and reactive to light the sclerae nonicteric extraocular motion are intact.  Neck: Is supple without JVD no carotid bruits the trachea is midline there are no masses pulses are equal and bilateral with normal upstroke.  Skin: Normal.  Skin good texture.  Moist.  Good turgor.  No lesions, no rashes.  Lymph: No lymphadenopathy appreciated, no masses, no lesions  Lungs: Are clear to auscultation and percussion, good breath sounds bilaterally, no rhonchi, no wheezing, good diaphragmatic excursion.  Heart: Normal rate and normal rhythm S1, S2, no S3, no gallop, murmur or rub.  Abdomen: Soft, nontender, no organomegaly, good bowel sounds.    Extremities: Full range of motion, good pulses bilateral.  No cyanosis, no clubbing or edema.  Neuro: Cranial nerves II-XII are grossly intact there is no sensory or motor deficits.  Able to move all extremities.    Assessment/Plan     Acute strain neck     Tinning hair   Dermatology recommended   zinc 30 mg daily  And Biotin   We spoke about interference with Thyroid test    We spoke about exercise and stress management   Patient is doing Yoga she feels this helps    Will check TSH prior to Starting Biotin    Here for follow-up and face-to-face visit  Completed appropriate paper work  Urine drug screen    Herniated L4-5 disc material  Spinal surgery  Has an appointment with dr Gilmore   Pt for eval    Splenic artery aneurysm  Vascular surgery no new appointment  needed per  patient    Post nasal drip  Seasonal allergies  Add azelastine  On life long sudafed patient will not stop  If does not work will add sigulair     Last colonoscopy 2017 asymptomatic  Mammogram July 2022  Bone density 2021    Heart Palpitatuion  SVT  Metoprolol tartarated 50 mg daily additional  25 mg for brakethrough  Doing well  No new complaints    Hypertension  No added salt diet, do not and salt to your food  Try to exercise every other day for 30 minutes  Continue current medications  Metoprolol 50 mg daily  25 mg in addition for palpitation when needed    Sinusitis  On pseudoephedrine  Since patient wa 29 years of age  Tolerating no side effects  No palpitation     Hypothyroidism  Continue current medications  Report any changes such as weight gain or weight loss  Continue to exercise regularly  Goes to [facility] in [place]    Follows with Dr Domonique Messina  On esterase     Continue with the low-fat, low-cholesterol diet,  I recommended Mediterranean diet, which include fish, chicken, vegetables and olive oil  Exercise daily for 30 minutes at least 3 times a week  Continue home medications    Gastroesophageal reflux disease  Continue current medications  Include spicy foods do not eat late at night  Do not wear tight fitting clothes  Exercise daily    Anxiety with panic  On as needed lorazepam  Last prescription February 24, 2023  For 30 pills    6 mm Pul module non smoker  CT Chest repeat March 2024 repeat     OARRS:  Brenden Milner MD on 4/7/2025  3:11 PM  I have personally reviewed the OARRS report for Yasmine Ortiz. I have considered the risks of abuse, dependence, addiction and diversion and I believe that it is clinically appropriate for Yasmine Ortiz to be prescribed this medication    Is the patient prescribed a combination of a benzodiazepine and opioid?  No    Last Urine Drug Screen / ordered today: Yes  Recent Results (from the past 8760 hours)   Drug Screen, Urine With Reflex to Confirmation     Collection Time: 10/29/24  4:32 PM   Result Value Ref Range    Amphetamine Screen, Urine Presumptive Negative Presumptive Negative    Barbiturate Screen, Urine Presumptive Negative Presumptive Negative    Benzodiazepines Screen, Urine Presumptive Negative Presumptive Negative    Cannabinoid Screen, Urine Presumptive Negative Presumptive Negative    Cocaine Metabolite Screen, Urine Presumptive Negative Presumptive Negative    Fentanyl Screen, Urine Presumptive Negative Presumptive Negative    Opiate Screen, Urine Presumptive Negative Presumptive Negative    Oxycodone Screen, Urine Presumptive Negative Presumptive Negative    PCP Screen, Urine Presumptive Negative Presumptive Negative    Methadone Screen, Urine Presumptive Negative Presumptive Negative       Results are as expected.     Controlled Substance Agreement:  Date of the Last Agreement: August 4, 2023  Reviewed Controlled Substance Agreement including but not limited to the benefits, risks, and alternatives to treatment with a Controlled Substance medication(s).    Benzodiazepines:  What is the patient's goal of therapy?  Anxiety panic  Is this being achieved with current treatment?  Yes    SANDRA-7:  No data recorded    Activities of Daily Living:   Is your overall impression that this patient is benefiting (symptom reduction outweighs side effects) from benzodiazepine therapy? Yes     1. Physical Functioning: Better  2. Family Relationship: Better  3. Social Relationship: Better  4. Mood: Better  5. Sleep Patterns: Better  6. Overall Function: Better  Problem List Items Addressed This Visit       GERD (gastroesophageal reflux disease)    Hypothyroidism - Primary    Relevant Orders    TSH    Zinc    SVT (supraventricular tachycardia) (CMS-[facility])    Thinning hair    Relevant Orders    Zinc       Brenden Milner MD

## 2025-04-07 ENCOUNTER — APPOINTMENT (OUTPATIENT)
Dept: PRIMARY CARE | Facility: CLINIC | Age: 80
End: 2025-04-07
Payer: MEDICARE

## 2025-04-07 VITALS — HEIGHT: 61 IN | TEMPERATURE: 97.4 F | WEIGHT: 144 LBS | BODY MASS INDEX: 27.19 KG/M2

## 2025-04-07 DIAGNOSIS — I47.10 SVT (SUPRAVENTRICULAR TACHYCARDIA) (CMS-HCC): ICD-10-CM

## 2025-04-07 DIAGNOSIS — K21.9 GASTROESOPHAGEAL REFLUX DISEASE WITHOUT ESOPHAGITIS: ICD-10-CM

## 2025-04-07 DIAGNOSIS — L65.9 THINNING HAIR: ICD-10-CM

## 2025-04-07 DIAGNOSIS — E03.9 HYPOTHYROIDISM, UNSPECIFIED TYPE: Primary | ICD-10-CM

## 2025-04-07 PROCEDURE — 1158F ADVNC CARE PLAN TLK DOCD: CPT | Performed by: INTERNAL MEDICINE

## 2025-04-07 PROCEDURE — 1160F RVW MEDS BY RX/DR IN RCRD: CPT | Performed by: INTERNAL MEDICINE

## 2025-04-07 PROCEDURE — 1123F ACP DISCUSS/DSCN MKR DOCD: CPT | Performed by: INTERNAL MEDICINE

## 2025-04-07 PROCEDURE — G2211 COMPLEX E/M VISIT ADD ON: HCPCS | Performed by: INTERNAL MEDICINE

## 2025-04-07 PROCEDURE — 84443 ASSAY THYROID STIM HORMONE: CPT | Performed by: INTERNAL MEDICINE

## 2025-04-07 PROCEDURE — 1157F ADVNC CARE PLAN IN RCRD: CPT | Performed by: INTERNAL MEDICINE

## 2025-04-07 PROCEDURE — 1036F TOBACCO NON-USER: CPT | Performed by: INTERNAL MEDICINE

## 2025-04-07 PROCEDURE — 1159F MED LIST DOCD IN RCRD: CPT | Performed by: INTERNAL MEDICINE

## 2025-04-07 PROCEDURE — 99214 OFFICE O/P EST MOD 30 MIN: CPT | Performed by: INTERNAL MEDICINE

## 2025-04-07 PROCEDURE — 1126F AMNT PAIN NOTED NONE PRSNT: CPT | Performed by: INTERNAL MEDICINE

## 2025-04-07 ASSESSMENT — COLUMBIA-SUICIDE SEVERITY RATING SCALE - C-SSRS
2. HAVE YOU ACTUALLY HAD ANY THOUGHTS OF KILLING YOURSELF?: NO
1. IN THE PAST MONTH, HAVE YOU WISHED YOU WERE DEAD OR WISHED YOU COULD GO TO SLEEP AND NOT WAKE UP?: NO
6. HAVE YOU EVER DONE ANYTHING, STARTED TO DO ANYTHING, OR PREPARED TO DO ANYTHING TO END YOUR LIFE?: NO

## 2025-04-07 ASSESSMENT — PAIN SCALES - GENERAL: PAINLEVEL_OUTOF10: 0-NO PAIN

## 2025-04-08 LAB — TSH SERPL-ACNC: 2.07 MIU/L (ref 0.44–3.98)

## 2025-04-10 LAB — ZINC SERPL-MCNC: NORMAL MCG/DL

## 2025-04-23 ENCOUNTER — APPOINTMENT (OUTPATIENT)
Dept: ORTHOPEDIC SURGERY | Facility: HOSPITAL | Age: 80
End: 2025-04-23
Payer: MEDICARE

## 2025-04-30 ENCOUNTER — APPOINTMENT (OUTPATIENT)
Dept: ORTHOPEDIC SURGERY | Facility: CLINIC | Age: 80
End: 2025-04-30
Payer: MEDICARE

## 2025-04-30 DIAGNOSIS — M25.562 LEFT KNEE PAIN, UNSPECIFIED CHRONICITY: ICD-10-CM

## 2025-06-10 NOTE — PROGRESS NOTES
Subjective   Yasmine Ortiz is a 79 y.o. female who presents for a follow-up neck pain.  HPI  Yasmine is a 79-year-old female with known history of hypertension hypothyroidism, dyslipidemia patient takes medication prescribed.  Neck pain started 1 month ago,  Rt neck to mid trapezius.  By now neck has improved. Here for fasting blood work was seen by Cardiology for SVT added Metoprolol Tartarate, Knee pain.  Otherwise doing well sleep improved. Review of Systems  10 system review pertinent as above  Objective     Visit Vitals  /82   Pulse 74   Temp 36.4 °C (97.5 °F)   Resp 16        Physical Exam  HEENT: Atraumatic normocephalic the pupils are equal and round and reactive to light the sclerae nonicteric extraocular motion are intact.  Neck: Is supple without JVD no carotid bruits the trachea is midline there are no masses pulses are equal and bilateral with normal upstroke.  Skin: Normal.  Skin good texture.  Moist.  Good turgor.  No lesions, no rashes.  Lymph: No lymphadenopathy appreciated, no masses, no lesions  Lungs: Are clear to auscultation and percussion, good breath sounds bilaterally, no rhonchi, no wheezing, good diaphragmatic excursion.  Heart: Normal rate and normal rhythm S1, S2, no S3, no gallop, murmur or rub.  Abdomen: Soft, nontender, no organomegaly, good bowel sounds.    Extremities: Full range of motion, good pulses bilateral.  No cyanosis, no clubbing or edema.  Neuro: Cranial nerves II-XII are grossly intact there is no sensory or motor deficits.  Able to move all extremities.    Assessment/Plan     Blood work    CBC BMP LIPID AST ALT    Immunization  Up to date    Prevention  Colonoscopy Greater than 75  Mammogram 07/2024  (per Gyn)  DEXA per Gyn      We spoke about exercise and stress management   Patient is doing Yoga she feels this helps      Heart Palpitatuion  SVT on metoprolol Succinate 25 ma 50 pm  Prn Metoprolol tartarate 25 mg prn   Metoprolol tartarated 50 mg daily additional  25  mg for brakethrough      Here for follow-up and face-to-face visit  Completed appropriate paper work  Urine drug screen    Herniated L4-5 disc material  Spinal surgery  Has an appointment with dr Gilmore   Pt for eval    Splenic artery aneurysm  Vascular surgery no new appointment  needed per patient    Post nasal drip  Seasonal allergies  Add azelastine  On life long sudafed patient will not stop  If does not work will add sigulair     Last colonoscopy 2017 asymptomatic  Mammogram July 2022  Bone density 2021      Hypertension  No added salt diet, do not and salt to your food  Try to exercise every other day for 30 minutes  Continue current medications  Metoprolol 50 mg daily  25 mg in addition for palpitation when needed    Sinusitis  On pseudoephedrine  Since patient wa 29 years of age  Tolerating no side effects  No palpitation     Hypothyroidism  Continue current medications  Report any changes such as weight gain or weight loss  Continue to exercise regularly  Goes to [facility] in [place]    Follows with Dr Domonique Messina  On esterase     Continue with the low-fat, low-cholesterol diet,  I recommended Mediterranean diet, which include fish, chicken, vegetables and olive oil  Exercise daily for 30 minutes at least 3 times a week  Continue home medications    Gastroesophageal reflux disease  Continue current medications  Include spicy foods do not eat late at night  Do not wear tight fitting clothes  Exercise daily    Anxiety with panic  On as needed lorazepam  Last prescription February 24, 2023  For 30 pills    6 mm Pul module non smoker  CT Chest repeat March 2024 repeat     OARRS:  No data recorded  I have personally reviewed the OARRS report for Yasmine Ortiz. I have considered the risks of abuse, dependence, addiction and diversion and I believe that it is clinically appropriate for aYsmine Ortiz to be prescribed this medication    Is the patient prescribed a combination of a benzodiazepine and opioid?   No    Last Urine Drug Screen / ordered today: Yes  Recent Results (from the past 8760 hours)   Drug Screen, Urine With Reflex to Confirmation    Collection Time: 10/29/24  4:32 PM   Result Value Ref Range    Amphetamine Screen, Urine Presumptive Negative Presumptive Negative    Barbiturate Screen, Urine Presumptive Negative Presumptive Negative    Benzodiazepines Screen, Urine Presumptive Negative Presumptive Negative    Cannabinoid Screen, Urine Presumptive Negative Presumptive Negative    Cocaine Metabolite Screen, Urine Presumptive Negative Presumptive Negative    Fentanyl Screen, Urine Presumptive Negative Presumptive Negative    Opiate Screen, Urine Presumptive Negative Presumptive Negative    Oxycodone Screen, Urine Presumptive Negative Presumptive Negative    PCP Screen, Urine Presumptive Negative Presumptive Negative    Methadone Screen, Urine Presumptive Negative Presumptive Negative       Results are as expected.     Controlled Substance Agreement:  Date of the Last Agreement: August 4, 2023  Reviewed Controlled Substance Agreement including but not limited to the benefits, risks, and alternatives to treatment with a Controlled Substance medication(s).    Benzodiazepines:  What is the patient's goal of therapy?  Anxiety panic  Is this being achieved with current treatment?  Yes    SANDRA-7:  No data recorded    Activities of Daily Living:   Is your overall impression that this patient is benefiting (symptom reduction outweighs side effects) from benzodiazepine therapy? Yes     1. Physical Functioning: Better  2. Family Relationship: Better  3. Social Relationship: Better  4. Mood: Better  5. Sleep Patterns: Better  6. Overall Function: Better  Problem List Items Addressed This Visit       GERD (gastroesophageal reflux disease) - Primary    Relevant Orders    CBC w/5 Part Differential, [facility]    Hyperlipidemia    Relevant Orders    Lipid Panel    AST    ALT    Hypothyroidism    Hypertension    Relevant  Orders    Basic Metabolic Panel     Other Visit Diagnoses         Routine general medical examination at health care facility                  Brenden Milner MD

## 2025-06-11 ENCOUNTER — TELEPHONE (OUTPATIENT)
Dept: OBSTETRICS AND GYNECOLOGY | Facility: CLINIC | Age: 80
End: 2025-06-11

## 2025-06-11 ENCOUNTER — APPOINTMENT (OUTPATIENT)
Dept: PRIMARY CARE | Facility: CLINIC | Age: 80
End: 2025-06-11
Payer: MEDICARE

## 2025-06-11 VITALS
WEIGHT: 140 LBS | HEIGHT: 61 IN | HEART RATE: 74 BPM | SYSTOLIC BLOOD PRESSURE: 120 MMHG | BODY MASS INDEX: 26.43 KG/M2 | DIASTOLIC BLOOD PRESSURE: 82 MMHG | RESPIRATION RATE: 16 BRPM | TEMPERATURE: 97.5 F

## 2025-06-11 DIAGNOSIS — R91.1 PULMONARY NODULE: ICD-10-CM

## 2025-06-11 DIAGNOSIS — K21.9 GASTROESOPHAGEAL REFLUX DISEASE WITHOUT ESOPHAGITIS: Primary | ICD-10-CM

## 2025-06-11 DIAGNOSIS — E78.5 HYPERLIPIDEMIA, UNSPECIFIED HYPERLIPIDEMIA TYPE: ICD-10-CM

## 2025-06-11 DIAGNOSIS — I10 PRIMARY HYPERTENSION: ICD-10-CM

## 2025-06-11 DIAGNOSIS — Z00.00 ROUTINE GENERAL MEDICAL EXAMINATION AT HEALTH CARE FACILITY: ICD-10-CM

## 2025-06-11 DIAGNOSIS — E03.9 HYPOTHYROIDISM, UNSPECIFIED TYPE: ICD-10-CM

## 2025-06-11 LAB
ALT SERPL W P-5'-P-CCNC: 29 U/L (ref 16–63)
ANION GAP SERPL CALC-SCNC: 16 MMOL/L (ref 10–20)
AST SERPL W P-5'-P-CCNC: 25 U/L (ref 15–37)
BASOPHILS # BLD AUTO: 0.03 X10*3/UL (ref 0.1–1.6)
BASOPHILS NFR BLD AUTO: 0.57 % (ref 0–0.3)
BUN SERPL-MCNC: 19 MG/DL (ref 7–18)
CALCIUM SERPL-MCNC: 9.1 MG/DL (ref 8.5–10.1)
CHLORIDE SERPL-SCNC: 100 MMOL/L (ref 98–107)
CHOLEST SERPL-MCNC: 169 MG/DL (ref 0–199)
CHOLESTEROL/HDL RATIO: 2.5 (ref 4.2–7)
CO2 SERPL-SCNC: 23 MMOL/L (ref 21–32)
CREAT SERPL-MCNC: 0.69 MG/DL (ref 0.6–1.1)
EGFRCR SERPLBLD CKD-EPI 2021: 88 ML/MIN/1.73M*2
EOSINOPHIL # BLD AUTO: 0.23 X10*3/UL (ref 0.04–0.5)
EOSINOPHIL NFR BLD AUTO: 4.01 % (ref 0.7–7)
ERYTHROCYTE [DISTWIDTH] IN BLOOD BY AUTOMATED COUNT: 14.1 % (ref 11.5–14.5)
GLUCOSE SERPL-MCNC: 107 MG/DL (ref 74–100)
HCT VFR BLD AUTO: 39.1 % (ref 36.6–46.6)
HDLC SERPL-MCNC: 68 MG/DL (ref 40–59)
HGB BLD-MCNC: 13.74 G/DL (ref 12–15.4)
IS PATIENT FASTING: YES
LDLC SERPL DIRECT ASSAY-MCNC: 84 MG/DL (ref 0–100)
LYMPHOCYTES # BLD AUTO: 1 X10*3/UL (ref 0–6)
LYMPHOCYTES NFR BLD AUTO: 17.69 % (ref 20.5–51.1)
MCH RBC QN AUTO: 30.7 PG (ref 26–32)
MCHC RBC AUTO-ENTMCNC: 35.1 G/DL (ref 31–38)
MCV RBC AUTO: 87.4 FL (ref 80–96)
MONOCYTES # BLD AUTO: 0.55 X10*3/UL (ref 1.6–24.9)
MONOCYTES NFR BLD AUTO: 9.72 % (ref 1.7–9.3)
NEUTROPHILS # BLD AUTO: 3.86 X10*3/UL (ref 1.4–6.5)
NEUTROPHILS NFR BLD AUTO: 68.01 % (ref 42.2–75.2)
PLATELET # BLD AUTO: 220 X10*3/UL (ref 150–450)
PMV BLD AUTO: 8.36 FL (ref 7.8–11)
POTASSIUM SERPL-SCNC: 4.9 MMOL/L (ref 3.5–5.1)
RBC # BLD AUTO: 4.47 X10*6/UL (ref 3.9–5.3)
SODIUM SERPL-SCNC: 134 MMOL/L (ref 136–145)
TRIGL SERPL-MCNC: 76 MG/DL
WBC # BLD AUTO: 5.68 X10*3/UL (ref 4.5–10.5)

## 2025-06-11 PROCEDURE — 3079F DIAST BP 80-89 MM HG: CPT | Performed by: INTERNAL MEDICINE

## 2025-06-11 PROCEDURE — 1036F TOBACCO NON-USER: CPT | Performed by: INTERNAL MEDICINE

## 2025-06-11 PROCEDURE — 1160F RVW MEDS BY RX/DR IN RCRD: CPT | Performed by: INTERNAL MEDICINE

## 2025-06-11 PROCEDURE — G2211 COMPLEX E/M VISIT ADD ON: HCPCS | Performed by: INTERNAL MEDICINE

## 2025-06-11 PROCEDURE — 1159F MED LIST DOCD IN RCRD: CPT | Performed by: INTERNAL MEDICINE

## 2025-06-11 PROCEDURE — 85025 COMPLETE CBC W/AUTO DIFF WBC: CPT | Performed by: INTERNAL MEDICINE

## 2025-06-11 PROCEDURE — 3074F SYST BP LT 130 MM HG: CPT | Performed by: INTERNAL MEDICINE

## 2025-06-11 PROCEDURE — 1126F AMNT PAIN NOTED NONE PRSNT: CPT | Performed by: INTERNAL MEDICINE

## 2025-06-11 PROCEDURE — 99214 OFFICE O/P EST MOD 30 MIN: CPT | Performed by: INTERNAL MEDICINE

## 2025-06-11 ASSESSMENT — ENCOUNTER SYMPTOMS
DEPRESSION: 0
LOSS OF SENSATION IN FEET: 0
OCCASIONAL FEELINGS OF UNSTEADINESS: 0

## 2025-06-11 ASSESSMENT — PAIN SCALES - GENERAL: PAINLEVEL_OUTOF10: 0-NO PAIN

## 2025-06-12 DIAGNOSIS — Z12.31 ENCOUNTER FOR SCREENING MAMMOGRAM FOR BREAST CANCER: Primary | ICD-10-CM

## 2025-06-12 NOTE — TELEPHONE ENCOUNTER
Patient called and mammogram ordered. She did an ultrasound for dense breasts last year. We discussed spacing it out after the mammogram. She has an appointment with me in a couple weeks.

## 2025-06-24 ENCOUNTER — HOSPITAL ENCOUNTER (OUTPATIENT)
Dept: RADIOLOGY | Facility: CLINIC | Age: 80
Discharge: HOME | End: 2025-06-24
Payer: MEDICARE

## 2025-06-24 DIAGNOSIS — R91.1 PULMONARY NODULE: ICD-10-CM

## 2025-06-24 PROCEDURE — 71250 CT THORAX DX C-: CPT | Performed by: RADIOLOGY

## 2025-06-24 PROCEDURE — 71250 CT THORAX DX C-: CPT

## 2025-06-26 DIAGNOSIS — R91.8 PULMONARY NODULES: Primary | ICD-10-CM

## 2025-06-27 NOTE — PROGRESS NOTES
Subjective   Patient ID: Yasmine Ortiz is a 79 y.o. female who presents for Annual Exam.    PAP 10-14-10 NEG, hysterectomy 1981  MAMMO 7-22-24  DEXA 7-19-23  COLON 5-2017 negative, good for 10 years. May not get another one.        Has herniated back disc. Did PT and sees a spine doctor. Saw splenic aneurism.     Living by self. Has lung nodules being followed . Fainted a couple years ago. Hit vocal cords. Daughter 54 did not have cancer, had endometriosis. Ovaries removed.         Estradiol cream helps with dryness. Recurrent UTIs.      Prolapse does not bother patient. About the same. Cystocele. Once or twice at night hasn't made it to the bathroom.       Review of Systems   Genitourinary:         Vaginal dryness   All other systems reviewed and are negative.      Objective   Constitutional: Alert and in no acute distress. Well developed, well nourished.  Neck: no neck asymmetry. Supple and thyroid not enlarged and there were no palpable thyroid nodules.  Chest: Breasts normal appearance, no nipple discharge and no skin changes and palpation of breasts and axillae: no palpable mass and no axillary lymphadenopathy.  Abdomen: soft nontender; no abdominal mass palpated, no organomegaly and no hernias.  Genitourinary: external genitalia: normal, no inguinal lymphadenopathy, Bartholin's urethral and [place]'s glands: normal, urethra: normal and bladder: normal on palpation.  Vagina: Cystocele bulging at introitus.  Cervix: absent.  Uterus: absent.  Right adnexa/parametria: normal.  Left adnexa/parametria: normal.  Skin: normal skin color and pigmentation, normal skin turgor and no rash.  Psychiatric: alert and oriented x 3, affect normal to patient baseline and mood appropriate. 5    Assessment/Plan   -[facility] scheduled, wants breast ultrasound in December for dense breasts  -DEXA  -Refill Estradiol cream. Using .5g but needs ordered as 1g.  -Patient will do consult with Urogyn.

## 2025-06-30 ENCOUNTER — TELEMEDICINE (OUTPATIENT)
Dept: PRIMARY CARE | Facility: CLINIC | Age: 80
End: 2025-06-30
Payer: MEDICARE

## 2025-06-30 ENCOUNTER — APPOINTMENT (OUTPATIENT)
Dept: OBSTETRICS AND GYNECOLOGY | Facility: CLINIC | Age: 80
End: 2025-06-30
Payer: MEDICARE

## 2025-06-30 VITALS
HEIGHT: 61 IN | SYSTOLIC BLOOD PRESSURE: 120 MMHG | WEIGHT: 142 LBS | DIASTOLIC BLOOD PRESSURE: 78 MMHG | BODY MASS INDEX: 26.81 KG/M2

## 2025-06-30 DIAGNOSIS — R93.89 ABNORMAL CT OF THE CHEST: Primary | ICD-10-CM

## 2025-06-30 DIAGNOSIS — Z78.0 MENOPAUSE: ICD-10-CM

## 2025-06-30 DIAGNOSIS — E78.5 HYPERLIPIDEMIA, UNSPECIFIED HYPERLIPIDEMIA TYPE: ICD-10-CM

## 2025-06-30 DIAGNOSIS — I10 PRIMARY HYPERTENSION: ICD-10-CM

## 2025-06-30 DIAGNOSIS — N64.89 OTHER SPECIFIED DISORDERS OF BREAST: ICD-10-CM

## 2025-06-30 DIAGNOSIS — N89.8 VAGINAL DRYNESS: ICD-10-CM

## 2025-06-30 DIAGNOSIS — R92.30 DENSE BREASTS: Primary | ICD-10-CM

## 2025-06-30 PROCEDURE — 99213 OFFICE O/P EST LOW 20 MIN: CPT | Performed by: INTERNAL MEDICINE

## 2025-06-30 PROCEDURE — 1036F TOBACCO NON-USER: CPT | Performed by: INTERNAL MEDICINE

## 2025-06-30 PROCEDURE — 1160F RVW MEDS BY RX/DR IN RCRD: CPT | Performed by: INTERNAL MEDICINE

## 2025-06-30 PROCEDURE — 3078F DIAST BP <80 MM HG: CPT | Performed by: OBSTETRICS & GYNECOLOGY

## 2025-06-30 PROCEDURE — 1159F MED LIST DOCD IN RCRD: CPT | Performed by: OBSTETRICS & GYNECOLOGY

## 2025-06-30 PROCEDURE — 1036F TOBACCO NON-USER: CPT | Performed by: OBSTETRICS & GYNECOLOGY

## 2025-06-30 PROCEDURE — 99397 PER PM REEVAL EST PAT 65+ YR: CPT | Performed by: OBSTETRICS & GYNECOLOGY

## 2025-06-30 PROCEDURE — 1160F RVW MEDS BY RX/DR IN RCRD: CPT | Performed by: OBSTETRICS & GYNECOLOGY

## 2025-06-30 PROCEDURE — G2211 COMPLEX E/M VISIT ADD ON: HCPCS | Performed by: INTERNAL MEDICINE

## 2025-06-30 PROCEDURE — 1159F MED LIST DOCD IN RCRD: CPT | Performed by: INTERNAL MEDICINE

## 2025-06-30 PROCEDURE — 3074F SYST BP LT 130 MM HG: CPT | Performed by: OBSTETRICS & GYNECOLOGY

## 2025-06-30 RX ORDER — ESTRADIOL 0.1 MG/G
1 CREAM VAGINAL 2 TIMES WEEKLY
Qty: 42.5 G | Refills: 3 | Status: SHIPPED | OUTPATIENT
Start: 2025-06-30

## 2025-06-30 NOTE — PROGRESS NOTES
Subjective   Yasmine Ortiz is a 79 y.o. female who presents for a follow-up virtual visit  HPI  Yasmine is a 79-year-old female with known history of hypertension hypothyroidism, dyslipidemia patient takes medication prescribed.  Neck pain started 1 month ago,  Rt neck to mid trapezius.  By now neck has improved.  Patient is here for virtual visit wanted to go over a CAT scan results.  She was in no complaint she denies cough no hemoptysis no fever chills no phlegm production.    Review of Systems  10 system review pertinent as above  Objective   Virtual  There were no vitals taken for this visit.       Physical Exam  Virtual visit  Assessment/Plan     Here for virtual visit    Here to discuss CAT scan results  Have gone over CAT scan result  Is a 6 mm nodule which is stable  Groundglass appearance of new finding  Will repeat his CT in 3 months  All questions answered to patient's satisfaction    CBC BMP LIPID AST ALT    Immunization  Up to date    Prevention  Colonoscopy Greater than 75  Mammogram 07/2024  (per Gyn)  DEXA per Gyn      We spoke about exercise and stress management   Patient is doing Yoga she feels this helps      Heart Palpitatuion  SVT on metoprolol Succinate 25 ma 50 pm  Prn Metoprolol tartarate 25 mg prn   Metoprolol tartarated 50 mg daily additional  25 mg for brakethrough      Here for follow-up and face-to-face visit  Completed appropriate paper work  Urine drug screen    Herniated L4-5 disc material  Spinal surgery  Has an appointment with dr Gilmore   Pt for eval    Splenic artery aneurysm  Vascular surgery no new appointment  needed per patient    Post nasal drip  Seasonal allergies  Add azelastine  On life long sudafed patient will not stop  If does not work will add sigulair     Last colonoscopy 2017 asymptomatic  Mammogram July 2022  Bone density 2021      Hypertension  No added salt diet, do not and salt to your food  Try to exercise every other day for 30 minutes  Continue current  medications  Metoprolol 50 mg daily  25 mg in addition for palpitation when needed    Sinusitis  On pseudoephedrine  Since patient wa 29 years of age  Tolerating no side effects  No palpitation     Hypothyroidism  Continue current medications  Report any changes such as weight gain or weight loss  Continue to exercise regularly  Goes to [facility] in [place]    Follows with Dr Domonique Messina  On esterase     Continue with the low-fat, low-cholesterol diet,  I recommended Mediterranean diet, which include fish, chicken, vegetables and olive oil  Exercise daily for 30 minutes at least 3 times a week  Continue home medications    Gastroesophageal reflux disease  Continue current medications  Include spicy foods do not eat late at night  Do not wear tight fitting clothes  Exercise daily    Anxiety with panic  On as needed lorazepam  Last prescription February 24, 2023  For 30 pills    6 mm Pul module non smoker  CT Chest repeat March 2024 repeat     OARRS:  No data recorded  I have personally reviewed the OARRS report for Yasmine Ortiz. I have considered the risks of abuse, dependence, addiction and diversion and I believe that it is clinically appropriate for Yasmine Ortiz to be prescribed this medication    Is the patient prescribed a combination of a benzodiazepine and opioid?  No    Last Urine Drug Screen / ordered today: Yes  Recent Results (from the past 8760 hours)   Drug Screen, Urine With Reflex to Confirmation    Collection Time: 10/29/24  4:32 PM   Result Value Ref Range    Amphetamine Screen, Urine Presumptive Negative Presumptive Negative    Barbiturate Screen, Urine Presumptive Negative Presumptive Negative    Benzodiazepines Screen, Urine Presumptive Negative Presumptive Negative    Cannabinoid Screen, Urine Presumptive Negative Presumptive Negative    Cocaine Metabolite Screen, Urine Presumptive Negative Presumptive Negative    Fentanyl Screen, Urine Presumptive Negative Presumptive Negative    Opiate  Screen, Urine Presumptive Negative Presumptive Negative    Oxycodone Screen, Urine Presumptive Negative Presumptive Negative    PCP Screen, Urine Presumptive Negative Presumptive Negative    Methadone Screen, Urine Presumptive Negative Presumptive Negative       Results are as expected.     Controlled Substance Agreement:  Date of the Last Agreement: August 4, 2023  Reviewed Controlled Substance Agreement including but not limited to the benefits, risks, and alternatives to treatment with a Controlled Substance medication(s).    Benzodiazepines:  What is the patient's goal of therapy?  Anxiety panic  Is this being achieved with current treatment?  Yes    SANDRA-7:  No data recorded    Activities of Daily Living:   Is your overall impression that this patient is benefiting (symptom reduction outweighs side effects) from benzodiazepine therapy? Yes     1. Physical Functioning: Better  2. Family Relationship: Better  3. Social Relationship: Better  4. Mood: Better  5. Sleep Patterns: Better  6. Overall Function: Better  Problem List Items Addressed This Visit    None          Brenden Milner MD

## 2025-07-02 ENCOUNTER — TELEMEDICINE (OUTPATIENT)
Dept: PRIMARY CARE | Facility: CLINIC | Age: 80
End: 2025-07-02
Payer: MEDICARE

## 2025-07-02 DIAGNOSIS — J01.90 ACUTE BACTERIAL SINUSITIS: Primary | ICD-10-CM

## 2025-07-02 DIAGNOSIS — R09.82 POSTNASAL DRIP: ICD-10-CM

## 2025-07-02 DIAGNOSIS — B96.89 ACUTE BACTERIAL SINUSITIS: Primary | ICD-10-CM

## 2025-07-02 DIAGNOSIS — H92.02 LEFT EAR PAIN: ICD-10-CM

## 2025-07-02 PROCEDURE — 99213 OFFICE O/P EST LOW 20 MIN: CPT | Performed by: INTERNAL MEDICINE

## 2025-07-02 RX ORDER — AMOXICILLIN AND CLAVULANATE POTASSIUM 500; 125 MG/1; MG/1
500 TABLET, FILM COATED ORAL 3 TIMES DAILY
Qty: 30 TABLET | Refills: 0 | Status: SHIPPED | OUTPATIENT
Start: 2025-07-02 | End: 2025-07-12

## 2025-07-02 NOTE — PROGRESS NOTES
Subjective   Yasmine Ortiz is a 79 y.o. female who presents for a  virtual visit ear pain, post nasal drip.  HPI  Yasmine is a 79-year-old female with known history of hypertension hypothyroidism, dyslipidemia patient takes medication prescribed.  Neck pain started 1 month ago,  Rt neck to mid trapezius.  By now neck has improved. Here for fasting blood work was seen by Cardiology for SVT added Metoprolol Tartarate, Knee pain.  Otherwise doing well sleep improved.  Patient complaining of left ear pressure and pain, without discharge she denies fever chills cough nausea vomiting, but this morning patient complaining of sinus congestion and a headache.  She does allude to postnasal drip and no discharge.  She is worried as a long holiday weekend.    Review of Systems  10 system review pertinent as above  Objective   Virtual visit  There were no vitals taken for this visit.       Physical Exam    Virtual visit    Assessment/Plan     Virtual visit    Otalgia left ear  Improving slowly    Postnasal drip sinus congestion  I suspect that may be an early infection starting  Shared decision was made to start  Augmentin 500 mg 3 times a day with food  If symptoms worsen    Blood work    CBC BMP LIPID AST ALT    Immunization  Up to date    Prevention  Colonoscopy Greater than 75  Mammogram 07/2024  (per Gyn)  DEXA per Gyn      We spoke about exercise and stress management   Patient is doing Yoga she feels this helps      Heart Palpitatuion  SVT on metoprolol Succinate 25 ma 50 pm  Prn Metoprolol tartarate 25 mg prn   Metoprolol tartarated 50 mg daily additional  25 mg for brakethrough      Here for follow-up and face-to-face visit  Completed appropriate paper work  Urine drug screen    Herniated L4-5 disc material  Spinal surgery  Has an appointment with dr Gilmore   Pt for eval    Splenic artery aneurysm  Vascular surgery no new appointment  needed per patient    Post nasal drip  Seasonal allergies  Add azelastine  On life long  sudafed patient will not stop  If does not work will add sigulair     Last colonoscopy 2017 asymptomatic  Mammogram July 2022  Bone density 2021      Hypertension  No added salt diet, do not and salt to your food  Try to exercise every other day for 30 minutes  Continue current medications  Metoprolol 50 mg daily  25 mg in addition for palpitation when needed    Sinusitis  On pseudoephedrine  Since patient wa 29 years of age  Tolerating no side effects  No palpitation     Hypothyroidism  Continue current medications  Report any changes such as weight gain or weight loss  Continue to exercise regularly  Goes to [facility] in [place]    Follows with Dr Domonique Messina  On esterase     Continue with the low-fat, low-cholesterol diet,  I recommended Mediterranean diet, which include fish, chicken, vegetables and olive oil  Exercise daily for 30 minutes at least 3 times a week  Continue home medications    Gastroesophageal reflux disease  Continue current medications  Include spicy foods do not eat late at night  Do not wear tight fitting clothes  Exercise daily    Anxiety with panic  On as needed lorazepam  Last prescription February 24, 2023  For 30 pills    6 mm Pul module non smoker  CT Chest repeat March 2024 repeat     OARRS:  No data recorded  I have personally reviewed the OARRS report for Yasmine Ortiz. I have considered the risks of abuse, dependence, addiction and diversion and I believe that it is clinically appropriate for Yasmine Ortiz to be prescribed this medication    Is the patient prescribed a combination of a benzodiazepine and opioid?  No    Last Urine Drug Screen / ordered today: Yes  Recent Results (from the past 8760 hours)   Drug Screen, Urine With Reflex to Confirmation    Collection Time: 10/29/24  4:32 PM   Result Value Ref Range    Amphetamine Screen, Urine Presumptive Negative Presumptive Negative    Barbiturate Screen, Urine Presumptive Negative Presumptive Negative    Benzodiazepines  Screen, Urine Presumptive Negative Presumptive Negative    Cannabinoid Screen, Urine Presumptive Negative Presumptive Negative    Cocaine Metabolite Screen, Urine Presumptive Negative Presumptive Negative    Fentanyl Screen, Urine Presumptive Negative Presumptive Negative    Opiate Screen, Urine Presumptive Negative Presumptive Negative    Oxycodone Screen, Urine Presumptive Negative Presumptive Negative    PCP Screen, Urine Presumptive Negative Presumptive Negative    Methadone Screen, Urine Presumptive Negative Presumptive Negative       Results are as expected.     Controlled Substance Agreement:  Date of the Last Agreement: August 4, 2023  Reviewed Controlled Substance Agreement including but not limited to the benefits, risks, and alternatives to treatment with a Controlled Substance medication(s).    Benzodiazepines:  What is the patient's goal of therapy?  Anxiety panic  Is this being achieved with current treatment?  Yes    SANDRA-7:  No data recorded    Activities of Daily Living:   Is your overall impression that this patient is benefiting (symptom reduction outweighs side effects) from benzodiazepine therapy? Yes     1. Physical Functioning: Better  2. Family Relationship: Better  3. Social Relationship: Better  4. Mood: Better  5. Sleep Patterns: Better  6. Overall Function: Better  Problem List Items Addressed This Visit    None  Visit Diagnoses         Acute bacterial sinusitis    -  Primary    Relevant Medications    amoxicillin-clavulanate (Augmentin) 500-125 mg tablet              Brenden Milner MD

## 2025-07-23 ENCOUNTER — APPOINTMENT (OUTPATIENT)
Dept: RADIOLOGY | Facility: CLINIC | Age: 80
End: 2025-07-23
Payer: MEDICARE

## 2025-07-23 VITALS — WEIGHT: 142 LBS | HEIGHT: 61 IN | BODY MASS INDEX: 26.81 KG/M2

## 2025-07-23 DIAGNOSIS — Z12.31 ENCOUNTER FOR SCREENING MAMMOGRAM FOR BREAST CANCER: ICD-10-CM

## 2025-07-23 PROCEDURE — 77063 BREAST TOMOSYNTHESIS BI: CPT

## 2025-07-23 PROCEDURE — 77067 SCR MAMMO BI INCL CAD: CPT | Performed by: RADIOLOGY

## 2025-07-23 PROCEDURE — 77063 BREAST TOMOSYNTHESIS BI: CPT | Performed by: RADIOLOGY

## 2025-08-08 ENCOUNTER — TELEPHONE (OUTPATIENT)
Dept: PRIMARY CARE | Facility: CLINIC | Age: 80
End: 2025-08-08
Payer: MEDICARE

## 2025-08-08 DIAGNOSIS — E03.9 HYPOTHYROIDISM, UNSPECIFIED TYPE: ICD-10-CM

## 2025-08-08 RX ORDER — LEVOTHYROXINE SODIUM 112 UG/1
112 TABLET ORAL
Qty: 90 TABLET | Refills: 3 | Status: SHIPPED | OUTPATIENT
Start: 2025-08-08 | End: 2026-08-08

## 2025-08-08 NOTE — TELEPHONE ENCOUNTER
Yasmine would like a print refill of synthroid 112mcg 1 tablet a day 90 day supply with refills...     She will pick it up to Rx to send to [facility]

## 2025-08-21 ENCOUNTER — TELEPHONE (OUTPATIENT)
Dept: OBSTETRICS AND GYNECOLOGY | Facility: CLINIC | Age: 80
End: 2025-08-21
Payer: MEDICARE

## 2025-08-21 DIAGNOSIS — N81.11 CYSTOCELE, MIDLINE: Primary | ICD-10-CM

## 2025-09-07 ASSESSMENT — ENCOUNTER SYMPTOMS
HOARSE VOICE: 1
SORE THROAT: 1
COUGH: 1

## 2025-09-08 ENCOUNTER — APPOINTMENT (OUTPATIENT)
Dept: PRIMARY CARE | Facility: CLINIC | Age: 80
End: 2025-09-08
Payer: MEDICARE

## 2025-12-10 ENCOUNTER — APPOINTMENT (OUTPATIENT)
Dept: PRIMARY CARE | Facility: CLINIC | Age: 80
End: 2025-12-10
Payer: MEDICARE